# Patient Record
Sex: MALE | Race: WHITE | Employment: OTHER | URBAN - METROPOLITAN AREA
[De-identification: names, ages, dates, MRNs, and addresses within clinical notes are randomized per-mention and may not be internally consistent; named-entity substitution may affect disease eponyms.]

---

## 2018-04-14 ENCOUNTER — ANESTHESIA (OUTPATIENT)
Dept: ENDOSCOPY | Age: 78
DRG: 300 | End: 2018-04-14
Payer: MEDICARE

## 2018-04-14 ENCOUNTER — HOSPITAL ENCOUNTER (INPATIENT)
Age: 78
LOS: 5 days | Discharge: HOME | DRG: 300 | End: 2018-04-19
Attending: EMERGENCY MEDICINE | Admitting: INTERNAL MEDICINE
Payer: MEDICARE

## 2018-04-14 ENCOUNTER — APPOINTMENT (OUTPATIENT)
Dept: GENERAL RADIOLOGY | Age: 78
DRG: 300 | End: 2018-04-14
Payer: MEDICARE

## 2018-04-14 ENCOUNTER — ANESTHESIA EVENT (OUTPATIENT)
Dept: ENDOSCOPY | Age: 78
DRG: 300 | End: 2018-04-14
Payer: MEDICARE

## 2018-04-14 VITALS — SYSTOLIC BLOOD PRESSURE: 96 MMHG | OXYGEN SATURATION: 93 % | DIASTOLIC BLOOD PRESSURE: 51 MMHG

## 2018-04-14 DIAGNOSIS — R10.13 ABDOMINAL PAIN, EPIGASTRIC: ICD-10-CM

## 2018-04-14 DIAGNOSIS — R77.8 ELEVATED TROPONIN: ICD-10-CM

## 2018-04-14 DIAGNOSIS — R03.0 ELEVATED BLOOD PRESSURE READING: ICD-10-CM

## 2018-04-14 DIAGNOSIS — D64.9 ANEMIA, UNSPECIFIED TYPE: ICD-10-CM

## 2018-04-14 DIAGNOSIS — R07.9 CHEST PAIN, UNSPECIFIED TYPE: Primary | ICD-10-CM

## 2018-04-14 LAB
ABO/RH: NORMAL
ALBUMIN SERPL-MCNC: 4.1 G/DL (ref 3.5–5.2)
ALP BLD-CCNC: 113 U/L (ref 40–129)
ALT SERPL-CCNC: 9 U/L (ref 0–40)
ANION GAP SERPL CALCULATED.3IONS-SCNC: 22 MMOL/L (ref 7–16)
ANTIBODY SCREEN: NORMAL
APTT: 27.3 SEC (ref 24.5–35.1)
AST SERPL-CCNC: 14 U/L (ref 0–39)
BASOPHILS ABSOLUTE: 0.04 E9/L (ref 0–0.2)
BASOPHILS RELATIVE PERCENT: 0.4 % (ref 0–2)
BILIRUB SERPL-MCNC: <0.2 MG/DL (ref 0–1.2)
BUN BLDV-MCNC: 37 MG/DL (ref 8–23)
CALCIUM SERPL-MCNC: 9.3 MG/DL (ref 8.6–10.2)
CHLORIDE BLD-SCNC: 97 MMOL/L (ref 98–107)
CHP ED QC CHECK: NORMAL
CO2: 14 MMOL/L (ref 22–29)
CREAT SERPL-MCNC: 1.3 MG/DL (ref 0.7–1.2)
D DIMER: 387 NG/ML DDU
EKG ATRIAL RATE: 86 BPM
EKG P AXIS: 33 DEGREES
EKG P-R INTERVAL: 170 MS
EKG Q-T INTERVAL: 360 MS
EKG QRS DURATION: 94 MS
EKG QTC CALCULATION (BAZETT): 430 MS
EKG R AXIS: 7 DEGREES
EKG T AXIS: 26 DEGREES
EKG VENTRICULAR RATE: 86 BPM
EOSINOPHILS ABSOLUTE: 0.4 E9/L (ref 0.05–0.5)
EOSINOPHILS RELATIVE PERCENT: 3.8 % (ref 0–6)
GFR AFRICAN AMERICAN: >60
GFR NON-AFRICAN AMERICAN: 53 ML/MIN/1.73
GLUCOSE BLD-MCNC: 213 MG/DL (ref 74–109)
HCT VFR BLD CALC: 21.8 % (ref 37–54)
HCT VFR BLD CALC: 23.4 % (ref 37–54)
HEMOCCULT STL QL: NEGATIVE
HEMOGLOBIN: 7.1 G/DL (ref 12.5–16.5)
HEMOGLOBIN: 7.3 G/DL (ref 12.5–16.5)
IMMATURE GRANULOCYTES #: 0.07 E9/L
IMMATURE GRANULOCYTES %: 0.7 % (ref 0–5)
INR BLD: 1.1
LACTIC ACID: 2.7 MMOL/L (ref 0.5–2.2)
LIPASE: 77 U/L (ref 13–60)
LYMPHOCYTES ABSOLUTE: 1.38 E9/L (ref 1.5–4)
LYMPHOCYTES RELATIVE PERCENT: 13.3 % (ref 20–42)
MCH RBC QN AUTO: 25.7 PG (ref 26–35)
MCHC RBC AUTO-ENTMCNC: 31.2 % (ref 32–34.5)
MCV RBC AUTO: 82.4 FL (ref 80–99.9)
METER GLUCOSE: 112 MG/DL (ref 70–110)
METER GLUCOSE: 114 MG/DL (ref 70–110)
METER GLUCOSE: 115 MG/DL (ref 70–110)
METER GLUCOSE: 155 MG/DL (ref 70–110)
MONOCYTES ABSOLUTE: 0.86 E9/L (ref 0.1–0.95)
MONOCYTES RELATIVE PERCENT: 8.3 % (ref 2–12)
NEUTROPHILS ABSOLUTE: 7.64 E9/L (ref 1.8–7.3)
NEUTROPHILS RELATIVE PERCENT: 73.5 % (ref 43–80)
PDW BLD-RTO: 15.8 FL (ref 11.5–15)
PLATELET # BLD: 321 E9/L (ref 130–450)
PMV BLD AUTO: 9.5 FL (ref 7–12)
POTASSIUM SERPL-SCNC: 3.9 MMOL/L (ref 3.5–5)
PROTHROMBIN TIME: 12.4 SEC (ref 9.3–12.4)
RBC # BLD: 2.84 E12/L (ref 3.8–5.8)
SODIUM BLD-SCNC: 133 MMOL/L (ref 132–146)
TOTAL PROTEIN: 7.5 G/DL (ref 6.4–8.3)
TROPONIN: 0.05 NG/ML (ref 0–0.03)
WBC # BLD: 10.4 E9/L (ref 4.5–11.5)

## 2018-04-14 PROCEDURE — 83605 ASSAY OF LACTIC ACID: CPT

## 2018-04-14 PROCEDURE — 6370000000 HC RX 637 (ALT 250 FOR IP): Performed by: INTERNAL MEDICINE

## 2018-04-14 PROCEDURE — 88305 TISSUE EXAM BY PATHOLOGIST: CPT

## 2018-04-14 PROCEDURE — 3609012400 HC EGD TRANSORAL BIOPSY SINGLE/MULTIPLE: Performed by: SURGERY

## 2018-04-14 PROCEDURE — 43239 EGD BIOPSY SINGLE/MULTIPLE: CPT | Performed by: SURGERY

## 2018-04-14 PROCEDURE — 7100000001 HC PACU RECOVERY - ADDTL 15 MIN: Performed by: SURGERY

## 2018-04-14 PROCEDURE — 36415 COLL VENOUS BLD VENIPUNCTURE: CPT

## 2018-04-14 PROCEDURE — 83690 ASSAY OF LIPASE: CPT

## 2018-04-14 PROCEDURE — 0DB98ZX EXCISION OF DUODENUM, VIA NATURAL OR ARTIFICIAL OPENING ENDOSCOPIC, DIAGNOSTIC: ICD-10-PCS | Performed by: SURGERY

## 2018-04-14 PROCEDURE — 84484 ASSAY OF TROPONIN QUANT: CPT

## 2018-04-14 PROCEDURE — 80053 COMPREHEN METABOLIC PANEL: CPT

## 2018-04-14 PROCEDURE — 85730 THROMBOPLASTIN TIME PARTIAL: CPT

## 2018-04-14 PROCEDURE — 6360000002 HC RX W HCPCS: Performed by: STUDENT IN AN ORGANIZED HEALTH CARE EDUCATION/TRAINING PROGRAM

## 2018-04-14 PROCEDURE — 6370000000 HC RX 637 (ALT 250 FOR IP): Performed by: EMERGENCY MEDICINE

## 2018-04-14 PROCEDURE — 85018 HEMOGLOBIN: CPT

## 2018-04-14 PROCEDURE — 2580000003 HC RX 258: Performed by: NURSE ANESTHETIST, CERTIFIED REGISTERED

## 2018-04-14 PROCEDURE — 85378 FIBRIN DEGRADE SEMIQUANT: CPT

## 2018-04-14 PROCEDURE — C9113 INJ PANTOPRAZOLE SODIUM, VIA: HCPCS | Performed by: STUDENT IN AN ORGANIZED HEALTH CARE EDUCATION/TRAINING PROGRAM

## 2018-04-14 PROCEDURE — 2580000003 HC RX 258: Performed by: INTERNAL MEDICINE

## 2018-04-14 PROCEDURE — 99285 EMERGENCY DEPT VISIT HI MDM: CPT

## 2018-04-14 PROCEDURE — 3700000000 HC ANESTHESIA ATTENDED CARE: Performed by: SURGERY

## 2018-04-14 PROCEDURE — 99222 1ST HOSP IP/OBS MODERATE 55: CPT | Performed by: SURGERY

## 2018-04-14 PROCEDURE — 2140000000 HC CCU INTERMEDIATE R&B

## 2018-04-14 PROCEDURE — 2580000003 HC RX 258: Performed by: STUDENT IN AN ORGANIZED HEALTH CARE EDUCATION/TRAINING PROGRAM

## 2018-04-14 PROCEDURE — 82962 GLUCOSE BLOOD TEST: CPT

## 2018-04-14 PROCEDURE — 85610 PROTHROMBIN TIME: CPT

## 2018-04-14 PROCEDURE — 94760 N-INVAS EAR/PLS OXIMETRY 1: CPT

## 2018-04-14 PROCEDURE — 6360000002 HC RX W HCPCS: Performed by: NURSE ANESTHETIST, CERTIFIED REGISTERED

## 2018-04-14 PROCEDURE — 86901 BLOOD TYPING SEROLOGIC RH(D): CPT

## 2018-04-14 PROCEDURE — 85025 COMPLETE CBC W/AUTO DIFF WBC: CPT

## 2018-04-14 PROCEDURE — 93005 ELECTROCARDIOGRAM TRACING: CPT | Performed by: EMERGENCY MEDICINE

## 2018-04-14 PROCEDURE — 86900 BLOOD TYPING SEROLOGIC ABO: CPT

## 2018-04-14 PROCEDURE — 6370000000 HC RX 637 (ALT 250 FOR IP): Performed by: STUDENT IN AN ORGANIZED HEALTH CARE EDUCATION/TRAINING PROGRAM

## 2018-04-14 PROCEDURE — 86850 RBC ANTIBODY SCREEN: CPT

## 2018-04-14 PROCEDURE — 7100000000 HC PACU RECOVERY - FIRST 15 MIN: Performed by: SURGERY

## 2018-04-14 PROCEDURE — 85014 HEMATOCRIT: CPT

## 2018-04-14 PROCEDURE — 3700000001 HC ADD 15 MINUTES (ANESTHESIA): Performed by: SURGERY

## 2018-04-14 PROCEDURE — 71045 X-RAY EXAM CHEST 1 VIEW: CPT

## 2018-04-14 RX ORDER — POLYETHYLENE GLYCOL 3350, SODIUM CHLORIDE, POTASSIUM CHLORIDE, SODIUM BICARBONATE, AND SODIUM SULFATE 240; 5.84; 2.98; 6.72; 22.72 G/4L; G/4L; G/4L; G/4L; G/4L
4000 POWDER, FOR SOLUTION ORAL ONCE
Status: COMPLETED | OUTPATIENT
Start: 2018-04-14 | End: 2018-04-14

## 2018-04-14 RX ORDER — SODIUM CHLORIDE 0.9 % (FLUSH) 0.9 %
10 SYRINGE (ML) INJECTION EVERY 12 HOURS SCHEDULED
Status: DISCONTINUED | OUTPATIENT
Start: 2018-04-14 | End: 2018-04-19 | Stop reason: HOSPADM

## 2018-04-14 RX ORDER — 0.9 % SODIUM CHLORIDE 0.9 %
10 VIAL (ML) INJECTION DAILY
Status: DISCONTINUED | OUTPATIENT
Start: 2018-04-14 | End: 2018-04-14

## 2018-04-14 RX ORDER — SODIUM CHLORIDE 9 MG/ML
INJECTION, SOLUTION INTRAVENOUS CONTINUOUS PRN
Status: DISCONTINUED | OUTPATIENT
Start: 2018-04-14 | End: 2018-04-14 | Stop reason: SDUPTHER

## 2018-04-14 RX ORDER — ACETAMINOPHEN 325 MG/1
650 TABLET ORAL EVERY 4 HOURS PRN
Status: DISCONTINUED | OUTPATIENT
Start: 2018-04-14 | End: 2018-04-19 | Stop reason: HOSPADM

## 2018-04-14 RX ORDER — SODIUM CHLORIDE 0.9 % (FLUSH) 0.9 %
10 SYRINGE (ML) INJECTION PRN
Status: DISCONTINUED | OUTPATIENT
Start: 2018-04-14 | End: 2018-04-19 | Stop reason: HOSPADM

## 2018-04-14 RX ORDER — ASPIRIN 81 MG/1
324 TABLET, CHEWABLE ORAL ONCE
Status: DISCONTINUED | OUTPATIENT
Start: 2018-04-14 | End: 2018-04-14

## 2018-04-14 RX ORDER — AMLODIPINE BESYLATE 10 MG/1
10 TABLET ORAL DAILY
Status: DISCONTINUED | OUTPATIENT
Start: 2018-04-14 | End: 2018-04-19 | Stop reason: HOSPADM

## 2018-04-14 RX ORDER — KETOROLAC TROMETHAMINE 30 MG/ML
15 INJECTION, SOLUTION INTRAMUSCULAR; INTRAVENOUS ONCE
Status: DISCONTINUED | OUTPATIENT
Start: 2018-04-14 | End: 2018-04-14

## 2018-04-14 RX ORDER — NICOTINE 21 MG/24HR
1 PATCH, TRANSDERMAL 24 HOURS TRANSDERMAL DAILY
Status: DISCONTINUED | OUTPATIENT
Start: 2018-04-14 | End: 2018-04-19 | Stop reason: HOSPADM

## 2018-04-14 RX ORDER — NITROGLYCERIN 0.4 MG/1
0.4 TABLET SUBLINGUAL EVERY 5 MIN PRN
Status: DISCONTINUED | OUTPATIENT
Start: 2018-04-14 | End: 2018-04-19 | Stop reason: HOSPADM

## 2018-04-14 RX ORDER — ATENOLOL 50 MG/1
50 TABLET ORAL DAILY
Status: DISCONTINUED | OUTPATIENT
Start: 2018-04-14 | End: 2018-04-16

## 2018-04-14 RX ORDER — PANTOPRAZOLE SODIUM 40 MG/10ML
40 INJECTION, POWDER, LYOPHILIZED, FOR SOLUTION INTRAVENOUS 2 TIMES DAILY
Status: DISCONTINUED | OUTPATIENT
Start: 2018-04-14 | End: 2018-04-18

## 2018-04-14 RX ORDER — DEXTROSE MONOHYDRATE 25 G/50ML
12.5 INJECTION, SOLUTION INTRAVENOUS PRN
Status: DISCONTINUED | OUTPATIENT
Start: 2018-04-14 | End: 2018-04-19 | Stop reason: HOSPADM

## 2018-04-14 RX ORDER — SODIUM CHLORIDE 9 MG/ML
INJECTION, SOLUTION INTRAVENOUS CONTINUOUS
Status: DISCONTINUED | OUTPATIENT
Start: 2018-04-14 | End: 2018-04-14 | Stop reason: ALTCHOICE

## 2018-04-14 RX ORDER — LISINOPRIL 20 MG/1
40 TABLET ORAL DAILY
Status: DISCONTINUED | OUTPATIENT
Start: 2018-04-14 | End: 2018-04-19 | Stop reason: HOSPADM

## 2018-04-14 RX ORDER — DEXTROSE MONOHYDRATE 50 MG/ML
100 INJECTION, SOLUTION INTRAVENOUS PRN
Status: DISCONTINUED | OUTPATIENT
Start: 2018-04-14 | End: 2018-04-19 | Stop reason: HOSPADM

## 2018-04-14 RX ORDER — SODIUM CHLORIDE 9 MG/ML
INJECTION, SOLUTION INTRAVENOUS CONTINUOUS
Status: DISCONTINUED | OUTPATIENT
Start: 2018-04-14 | End: 2018-04-19

## 2018-04-14 RX ORDER — POLYETHYLENE GLYCOL 3350, SODIUM CHLORIDE, POTASSIUM CHLORIDE, SODIUM BICARBONATE, AND SODIUM SULFATE 240; 5.84; 2.98; 6.72; 22.72 G/4L; G/4L; G/4L; G/4L; G/4L
4000 POWDER, FOR SOLUTION ORAL ONCE
Status: DISCONTINUED | OUTPATIENT
Start: 2018-04-14 | End: 2018-04-14

## 2018-04-14 RX ORDER — PROPOFOL 10 MG/ML
INJECTION, EMULSION INTRAVENOUS PRN
Status: DISCONTINUED | OUTPATIENT
Start: 2018-04-14 | End: 2018-04-14 | Stop reason: SDUPTHER

## 2018-04-14 RX ORDER — PANTOPRAZOLE SODIUM 40 MG/10ML
40 INJECTION, POWDER, LYOPHILIZED, FOR SOLUTION INTRAVENOUS DAILY
Status: DISCONTINUED | OUTPATIENT
Start: 2018-04-14 | End: 2018-04-14

## 2018-04-14 RX ORDER — NICOTINE POLACRILEX 4 MG
15 LOZENGE BUCCAL PRN
Status: DISCONTINUED | OUTPATIENT
Start: 2018-04-14 | End: 2018-04-19 | Stop reason: HOSPADM

## 2018-04-14 RX ORDER — PROPOFOL 10 MG/ML
INJECTION, EMULSION INTRAVENOUS PRN
Status: DISCONTINUED | OUTPATIENT
Start: 2018-04-14 | End: 2018-04-14

## 2018-04-14 RX ORDER — 0.9 % SODIUM CHLORIDE 0.9 %
10 VIAL (ML) INJECTION 2 TIMES DAILY
Status: DISCONTINUED | OUTPATIENT
Start: 2018-04-14 | End: 2018-04-18

## 2018-04-14 RX ADMIN — LISINOPRIL 40 MG: 20 TABLET ORAL at 09:34

## 2018-04-14 RX ADMIN — Medication 10 ML: at 20:58

## 2018-04-14 RX ADMIN — PANTOPRAZOLE SODIUM 40 MG: 40 INJECTION, POWDER, FOR SOLUTION INTRAVENOUS at 20:58

## 2018-04-14 RX ADMIN — ATENOLOL 50 MG: 50 TABLET ORAL at 09:34

## 2018-04-14 RX ADMIN — NITROGLYCERIN 0.4 MG: 0.4 TABLET SUBLINGUAL at 03:14

## 2018-04-14 RX ADMIN — Medication 10 ML: at 09:32

## 2018-04-14 RX ADMIN — Medication 10 ML: at 09:38

## 2018-04-14 RX ADMIN — SODIUM CHLORIDE: 9 INJECTION, SOLUTION INTRAVENOUS at 09:30

## 2018-04-14 RX ADMIN — SODIUM CHLORIDE: 9 INJECTION, SOLUTION INTRAVENOUS at 16:36

## 2018-04-14 RX ADMIN — POLYETHYLENE GLYCOL 3350, SODIUM CHLORIDE, POTASSIUM CHLORIDE, SODIUM BICARBONATE, AND SODIUM SULFATE 4000 ML: 240; 5.84; 2.98; 6.72; 22.72 POWDER, FOR SOLUTION ORAL at 16:27

## 2018-04-14 RX ADMIN — PANTOPRAZOLE SODIUM 40 MG: 40 INJECTION, POWDER, FOR SOLUTION INTRAVENOUS at 09:34

## 2018-04-14 RX ADMIN — PROPOFOL 150 MG: 10 INJECTION, EMULSION INTRAVENOUS at 12:00

## 2018-04-14 RX ADMIN — SODIUM CHLORIDE: 9 INJECTION, SOLUTION INTRAVENOUS at 11:55

## 2018-04-14 RX ADMIN — AMLODIPINE BESYLATE 10 MG: 10 TABLET ORAL at 09:34

## 2018-04-14 ASSESSMENT — PAIN DESCRIPTION - LOCATION
LOCATION: ABDOMEN
LOCATION: CHEST

## 2018-04-14 ASSESSMENT — PAIN DESCRIPTION - DESCRIPTORS
DESCRIPTORS: ACHING
DESCRIPTORS: ACHING;DISCOMFORT

## 2018-04-14 ASSESSMENT — PAIN SCALES - GENERAL
PAINLEVEL_OUTOF10: 0
PAINLEVEL_OUTOF10: 6
PAINLEVEL_OUTOF10: 0
PAINLEVEL_OUTOF10: 4

## 2018-04-14 ASSESSMENT — PAIN DESCRIPTION - PAIN TYPE
TYPE: ACUTE PAIN
TYPE: ACUTE PAIN

## 2018-04-14 ASSESSMENT — PAIN DESCRIPTION - FREQUENCY
FREQUENCY: CONTINUOUS
FREQUENCY: INTERMITTENT

## 2018-04-14 ASSESSMENT — PAIN DESCRIPTION - ONSET: ONSET: ON-GOING

## 2018-04-14 ASSESSMENT — PAIN DESCRIPTION - ORIENTATION: ORIENTATION: MID

## 2018-04-15 LAB
BLOOD BANK DISPENSE STATUS: NORMAL
BLOOD BANK PRODUCT CODE: NORMAL
BPU ID: NORMAL
DESCRIPTION BLOOD BANK: NORMAL
HCT VFR BLD CALC: 20.4 % (ref 37–54)
HCT VFR BLD CALC: 22.1 % (ref 37–54)
HEMOGLOBIN: 6.4 G/DL (ref 12.5–16.5)
HEMOGLOBIN: 6.9 G/DL (ref 12.5–16.5)
METER GLUCOSE: 104 MG/DL (ref 70–110)
METER GLUCOSE: 143 MG/DL (ref 70–110)
METER GLUCOSE: 226 MG/DL (ref 70–110)
METER GLUCOSE: 94 MG/DL (ref 70–110)

## 2018-04-15 PROCEDURE — 85018 HEMOGLOBIN: CPT

## 2018-04-15 PROCEDURE — 36430 TRANSFUSION BLD/BLD COMPNT: CPT

## 2018-04-15 PROCEDURE — 6360000002 HC RX W HCPCS: Performed by: STUDENT IN AN ORGANIZED HEALTH CARE EDUCATION/TRAINING PROGRAM

## 2018-04-15 PROCEDURE — 36415 COLL VENOUS BLD VENIPUNCTURE: CPT

## 2018-04-15 PROCEDURE — 6370000000 HC RX 637 (ALT 250 FOR IP): Performed by: STUDENT IN AN ORGANIZED HEALTH CARE EDUCATION/TRAINING PROGRAM

## 2018-04-15 PROCEDURE — 2580000003 HC RX 258: Performed by: INTERNAL MEDICINE

## 2018-04-15 PROCEDURE — 82962 GLUCOSE BLOOD TEST: CPT

## 2018-04-15 PROCEDURE — 85014 HEMATOCRIT: CPT

## 2018-04-15 PROCEDURE — 86923 COMPATIBILITY TEST ELECTRIC: CPT

## 2018-04-15 PROCEDURE — 2580000003 HC RX 258: Performed by: STUDENT IN AN ORGANIZED HEALTH CARE EDUCATION/TRAINING PROGRAM

## 2018-04-15 PROCEDURE — 6370000000 HC RX 637 (ALT 250 FOR IP): Performed by: INTERNAL MEDICINE

## 2018-04-15 PROCEDURE — 2140000000 HC CCU INTERMEDIATE R&B

## 2018-04-15 PROCEDURE — P9016 RBC LEUKOCYTES REDUCED: HCPCS

## 2018-04-15 PROCEDURE — C9113 INJ PANTOPRAZOLE SODIUM, VIA: HCPCS | Performed by: STUDENT IN AN ORGANIZED HEALTH CARE EDUCATION/TRAINING PROGRAM

## 2018-04-15 RX ORDER — 0.9 % SODIUM CHLORIDE 0.9 %
250 INTRAVENOUS SOLUTION INTRAVENOUS ONCE
Status: COMPLETED | OUTPATIENT
Start: 2018-04-15 | End: 2018-04-16

## 2018-04-15 RX ORDER — TRAMADOL HYDROCHLORIDE 50 MG/1
50 TABLET ORAL EVERY 6 HOURS PRN
Status: ON HOLD | COMMUNITY
End: 2020-03-19 | Stop reason: HOSPADM

## 2018-04-15 RX ORDER — CLOPIDOGREL BISULFATE 75 MG/1
75 TABLET ORAL DAILY
Status: ON HOLD | COMMUNITY
End: 2020-12-10 | Stop reason: HOSPADM

## 2018-04-15 RX ADMIN — Medication 10 ML: at 21:02

## 2018-04-15 RX ADMIN — SODIUM CHLORIDE: 9 INJECTION, SOLUTION INTRAVENOUS at 17:54

## 2018-04-15 RX ADMIN — SODIUM CHLORIDE 250 ML: 9 INJECTION, SOLUTION INTRAVENOUS at 21:33

## 2018-04-15 RX ADMIN — BISACODYL 10 MG: 5 TABLET, COATED ORAL at 15:01

## 2018-04-15 RX ADMIN — Medication 10 ML: at 06:13

## 2018-04-15 RX ADMIN — PANTOPRAZOLE SODIUM 40 MG: 40 INJECTION, POWDER, FOR SOLUTION INTRAVENOUS at 06:13

## 2018-04-15 RX ADMIN — ATENOLOL 50 MG: 50 TABLET ORAL at 08:50

## 2018-04-15 RX ADMIN — LISINOPRIL 40 MG: 20 TABLET ORAL at 08:49

## 2018-04-15 RX ADMIN — PANTOPRAZOLE SODIUM 40 MG: 40 INJECTION, POWDER, FOR SOLUTION INTRAVENOUS at 21:02

## 2018-04-15 RX ADMIN — INSULIN LISPRO 2 UNITS: 100 INJECTION, SOLUTION INTRAVENOUS; SUBCUTANEOUS at 16:29

## 2018-04-15 RX ADMIN — SODIUM CHLORIDE: 9 INJECTION, SOLUTION INTRAVENOUS at 06:12

## 2018-04-15 RX ADMIN — BISACODYL 10 MG: 5 TABLET, COATED ORAL at 17:52

## 2018-04-15 RX ADMIN — AMLODIPINE BESYLATE 10 MG: 10 TABLET ORAL at 08:50

## 2018-04-15 RX ADMIN — Medication 10 ML: at 21:03

## 2018-04-15 ASSESSMENT — PAIN SCALES - GENERAL
PAINLEVEL_OUTOF10: 0

## 2018-04-16 ENCOUNTER — ANESTHESIA EVENT (OUTPATIENT)
Dept: ENDOSCOPY | Age: 78
DRG: 300 | End: 2018-04-16
Payer: MEDICARE

## 2018-04-16 ENCOUNTER — ANESTHESIA (OUTPATIENT)
Dept: ENDOSCOPY | Age: 78
DRG: 300 | End: 2018-04-16
Payer: MEDICARE

## 2018-04-16 VITALS
DIASTOLIC BLOOD PRESSURE: 56 MMHG | OXYGEN SATURATION: 100 % | RESPIRATION RATE: 12 BRPM | SYSTOLIC BLOOD PRESSURE: 129 MMHG

## 2018-04-16 LAB
BLOOD BANK DISPENSE STATUS: NORMAL
BLOOD BANK PRODUCT CODE: NORMAL
BPU ID: NORMAL
DESCRIPTION BLOOD BANK: NORMAL
HCT VFR BLD CALC: 21.1 % (ref 37–54)
HCT VFR BLD CALC: 25.6 % (ref 37–54)
HCT VFR BLD CALC: 27.8 % (ref 37–54)
HCT VFR BLD CALC: 28 % (ref 37–54)
HEMOGLOBIN: 6.6 G/DL (ref 12.5–16.5)
HEMOGLOBIN: 8 G/DL (ref 12.5–16.5)
HEMOGLOBIN: 8.7 G/DL (ref 12.5–16.5)
HEMOGLOBIN: 9 G/DL (ref 12.5–16.5)
METER GLUCOSE: 104 MG/DL (ref 70–110)
METER GLUCOSE: 106 MG/DL (ref 70–110)
METER GLUCOSE: 207 MG/DL (ref 70–110)
METER GLUCOSE: 94 MG/DL (ref 70–110)

## 2018-04-16 PROCEDURE — 3609009900 HC COLONOSCOPY W/CONTROL BLEEDING ANY METHOD: Performed by: SURGERY

## 2018-04-16 PROCEDURE — 36430 TRANSFUSION BLD/BLD COMPNT: CPT

## 2018-04-16 PROCEDURE — C9113 INJ PANTOPRAZOLE SODIUM, VIA: HCPCS | Performed by: STUDENT IN AN ORGANIZED HEALTH CARE EDUCATION/TRAINING PROGRAM

## 2018-04-16 PROCEDURE — 45382 COLONOSCOPY W/CONTROL BLEED: CPT | Performed by: SURGERY

## 2018-04-16 PROCEDURE — 36415 COLL VENOUS BLD VENIPUNCTURE: CPT

## 2018-04-16 PROCEDURE — 6370000000 HC RX 637 (ALT 250 FOR IP): Performed by: INTERNAL MEDICINE

## 2018-04-16 PROCEDURE — 2580000003 HC RX 258: Performed by: INTERNAL MEDICINE

## 2018-04-16 PROCEDURE — 85018 HEMOGLOBIN: CPT

## 2018-04-16 PROCEDURE — 2580000003 HC RX 258: Performed by: NURSE ANESTHETIST, CERTIFIED REGISTERED

## 2018-04-16 PROCEDURE — 0DBL8ZX EXCISION OF TRANSVERSE COLON, VIA NATURAL OR ARTIFICIAL OPENING ENDOSCOPIC, DIAGNOSTIC: ICD-10-PCS | Performed by: SURGERY

## 2018-04-16 PROCEDURE — 2140000000 HC CCU INTERMEDIATE R&B

## 2018-04-16 PROCEDURE — 2720000010 HC SURG SUPPLY STERILE: Performed by: SURGERY

## 2018-04-16 PROCEDURE — 6360000002 HC RX W HCPCS: Performed by: STUDENT IN AN ORGANIZED HEALTH CARE EDUCATION/TRAINING PROGRAM

## 2018-04-16 PROCEDURE — 45384 COLONOSCOPY W/LESION REMOVAL: CPT | Performed by: SURGERY

## 2018-04-16 PROCEDURE — 3700000001 HC ADD 15 MINUTES (ANESTHESIA): Performed by: SURGERY

## 2018-04-16 PROCEDURE — 0W3P8ZZ CONTROL BLEEDING IN GASTROINTESTINAL TRACT, VIA NATURAL OR ARTIFICIAL OPENING ENDOSCOPIC: ICD-10-PCS | Performed by: SURGERY

## 2018-04-16 PROCEDURE — 2580000003 HC RX 258: Performed by: STUDENT IN AN ORGANIZED HEALTH CARE EDUCATION/TRAINING PROGRAM

## 2018-04-16 PROCEDURE — 88305 TISSUE EXAM BY PATHOLOGIST: CPT

## 2018-04-16 PROCEDURE — 3700000000 HC ANESTHESIA ATTENDED CARE: Performed by: SURGERY

## 2018-04-16 PROCEDURE — 86923 COMPATIBILITY TEST ELECTRIC: CPT

## 2018-04-16 PROCEDURE — P9016 RBC LEUKOCYTES REDUCED: HCPCS

## 2018-04-16 PROCEDURE — 7100000001 HC PACU RECOVERY - ADDTL 15 MIN: Performed by: SURGERY

## 2018-04-16 PROCEDURE — 7100000000 HC PACU RECOVERY - FIRST 15 MIN: Performed by: SURGERY

## 2018-04-16 PROCEDURE — 3609010300 HC COLONOSCOPY W/BIOPSY SINGLE/MULTIPLE: Performed by: SURGERY

## 2018-04-16 PROCEDURE — 6360000002 HC RX W HCPCS: Performed by: NURSE ANESTHETIST, CERTIFIED REGISTERED

## 2018-04-16 PROCEDURE — 82962 GLUCOSE BLOOD TEST: CPT

## 2018-04-16 PROCEDURE — 85014 HEMATOCRIT: CPT

## 2018-04-16 RX ORDER — 0.9 % SODIUM CHLORIDE 0.9 %
250 INTRAVENOUS SOLUTION INTRAVENOUS ONCE
Status: COMPLETED | OUTPATIENT
Start: 2018-04-16 | End: 2018-04-16

## 2018-04-16 RX ORDER — FENTANYL CITRATE 50 UG/ML
INJECTION, SOLUTION INTRAMUSCULAR; INTRAVENOUS PRN
Status: DISCONTINUED | OUTPATIENT
Start: 2018-04-16 | End: 2018-04-16 | Stop reason: SDUPTHER

## 2018-04-16 RX ORDER — PROPOFOL 10 MG/ML
INJECTION, EMULSION INTRAVENOUS PRN
Status: DISCONTINUED | OUTPATIENT
Start: 2018-04-16 | End: 2018-04-16 | Stop reason: SDUPTHER

## 2018-04-16 RX ORDER — ATENOLOL 25 MG/1
25 TABLET ORAL DAILY
Status: DISCONTINUED | OUTPATIENT
Start: 2018-04-17 | End: 2018-04-19 | Stop reason: HOSPADM

## 2018-04-16 RX ORDER — SODIUM CHLORIDE 9 MG/ML
INJECTION, SOLUTION INTRAVENOUS CONTINUOUS PRN
Status: DISCONTINUED | OUTPATIENT
Start: 2018-04-16 | End: 2018-04-16 | Stop reason: SDUPTHER

## 2018-04-16 RX ADMIN — SODIUM CHLORIDE: 9 INJECTION, SOLUTION INTRAVENOUS at 12:52

## 2018-04-16 RX ADMIN — PANTOPRAZOLE SODIUM 40 MG: 40 INJECTION, POWDER, FOR SOLUTION INTRAVENOUS at 15:31

## 2018-04-16 RX ADMIN — INSULIN LISPRO 1 UNITS: 100 INJECTION, SOLUTION INTRAVENOUS; SUBCUTANEOUS at 21:03

## 2018-04-16 RX ADMIN — Medication 10 ML: at 15:31

## 2018-04-16 RX ADMIN — SODIUM CHLORIDE: 9 INJECTION, SOLUTION INTRAVENOUS at 15:58

## 2018-04-16 RX ADMIN — AMLODIPINE BESYLATE 10 MG: 10 TABLET ORAL at 10:14

## 2018-04-16 RX ADMIN — ATENOLOL 25 MG: 25 TABLET ORAL at 10:17

## 2018-04-16 RX ADMIN — Medication 10 ML: at 10:18

## 2018-04-16 RX ADMIN — FENTANYL CITRATE 50 MCG: 50 INJECTION, SOLUTION INTRAMUSCULAR; INTRAVENOUS at 13:05

## 2018-04-16 RX ADMIN — Medication 10 ML: at 20:52

## 2018-04-16 RX ADMIN — PANTOPRAZOLE SODIUM 40 MG: 40 INJECTION, POWDER, FOR SOLUTION INTRAVENOUS at 20:52

## 2018-04-16 RX ADMIN — Medication 10 ML: at 15:33

## 2018-04-16 RX ADMIN — SODIUM CHLORIDE 250 ML: 9 INJECTION, SOLUTION INTRAVENOUS at 10:00

## 2018-04-16 RX ADMIN — LISINOPRIL 40 MG: 20 TABLET ORAL at 10:14

## 2018-04-16 RX ADMIN — FENTANYL CITRATE 50 MCG: 50 INJECTION, SOLUTION INTRAMUSCULAR; INTRAVENOUS at 13:16

## 2018-04-16 RX ADMIN — PROPOFOL 170 MG: 10 INJECTION, EMULSION INTRAVENOUS at 13:50

## 2018-04-16 ASSESSMENT — PAIN SCALES - GENERAL
PAINLEVEL_OUTOF10: 0
PAINLEVEL_OUTOF10: 4
PAINLEVEL_OUTOF10: 0
PAINLEVEL_OUTOF10: 0

## 2018-04-16 ASSESSMENT — PAIN DESCRIPTION - DESCRIPTORS: DESCRIPTORS: BURNING

## 2018-04-16 ASSESSMENT — PAIN DESCRIPTION - LOCATION: LOCATION: ABDOMEN

## 2018-04-16 ASSESSMENT — LIFESTYLE VARIABLES: SMOKING_STATUS: 1

## 2018-04-16 ASSESSMENT — PAIN DESCRIPTION - PAIN TYPE: TYPE: ACUTE PAIN

## 2018-04-17 ENCOUNTER — APPOINTMENT (OUTPATIENT)
Dept: NUCLEAR MEDICINE | Age: 78
DRG: 300 | End: 2018-04-17
Payer: MEDICARE

## 2018-04-17 LAB
HCT VFR BLD CALC: 23.9 % (ref 37–54)
HCT VFR BLD CALC: 23.9 % (ref 37–54)
HCT VFR BLD CALC: 25.8 % (ref 37–54)
HCT VFR BLD CALC: 26.1 % (ref 37–54)
HEMOGLOBIN: 7.6 G/DL (ref 12.5–16.5)
HEMOGLOBIN: 7.8 G/DL (ref 12.5–16.5)
HEMOGLOBIN: 8.1 G/DL (ref 12.5–16.5)
HEMOGLOBIN: 8.3 G/DL (ref 12.5–16.5)
METER GLUCOSE: 119 MG/DL (ref 70–110)
METER GLUCOSE: 124 MG/DL (ref 70–110)
METER GLUCOSE: 132 MG/DL (ref 70–110)
METER GLUCOSE: 210 MG/DL (ref 70–110)

## 2018-04-17 PROCEDURE — 85018 HEMOGLOBIN: CPT

## 2018-04-17 PROCEDURE — 36415 COLL VENOUS BLD VENIPUNCTURE: CPT

## 2018-04-17 PROCEDURE — 2580000003 HC RX 258: Performed by: STUDENT IN AN ORGANIZED HEALTH CARE EDUCATION/TRAINING PROGRAM

## 2018-04-17 PROCEDURE — 6370000000 HC RX 637 (ALT 250 FOR IP): Performed by: INTERNAL MEDICINE

## 2018-04-17 PROCEDURE — 99233 SBSQ HOSP IP/OBS HIGH 50: CPT | Performed by: SURGERY

## 2018-04-17 PROCEDURE — 6360000002 HC RX W HCPCS: Performed by: STUDENT IN AN ORGANIZED HEALTH CARE EDUCATION/TRAINING PROGRAM

## 2018-04-17 PROCEDURE — 78278 ACUTE GI BLOOD LOSS IMAGING: CPT

## 2018-04-17 PROCEDURE — C9113 INJ PANTOPRAZOLE SODIUM, VIA: HCPCS | Performed by: STUDENT IN AN ORGANIZED HEALTH CARE EDUCATION/TRAINING PROGRAM

## 2018-04-17 PROCEDURE — A9560 TC99M LABELED RBC: HCPCS | Performed by: RADIOLOGY

## 2018-04-17 PROCEDURE — 2580000003 HC RX 258: Performed by: INTERNAL MEDICINE

## 2018-04-17 PROCEDURE — 1200000000 HC SEMI PRIVATE

## 2018-04-17 PROCEDURE — 3430000000 HC RX DIAGNOSTIC RADIOPHARMACEUTICAL: Performed by: RADIOLOGY

## 2018-04-17 PROCEDURE — 85014 HEMATOCRIT: CPT

## 2018-04-17 PROCEDURE — 82962 GLUCOSE BLOOD TEST: CPT

## 2018-04-17 RX ADMIN — PANTOPRAZOLE SODIUM 40 MG: 40 INJECTION, POWDER, FOR SOLUTION INTRAVENOUS at 21:18

## 2018-04-17 RX ADMIN — PANTOPRAZOLE SODIUM 40 MG: 40 INJECTION, POWDER, FOR SOLUTION INTRAVENOUS at 08:26

## 2018-04-17 RX ADMIN — AMLODIPINE BESYLATE 10 MG: 10 TABLET ORAL at 08:26

## 2018-04-17 RX ADMIN — LISINOPRIL 40 MG: 20 TABLET ORAL at 08:26

## 2018-04-17 RX ADMIN — Medication 10 ML: at 21:18

## 2018-04-17 RX ADMIN — Medication 25 MILLICURIE: at 11:03

## 2018-04-17 RX ADMIN — SODIUM CHLORIDE: 9 INJECTION, SOLUTION INTRAVENOUS at 21:15

## 2018-04-17 RX ADMIN — SODIUM CHLORIDE: 9 INJECTION, SOLUTION INTRAVENOUS at 08:53

## 2018-04-17 RX ADMIN — SODIUM CHLORIDE: 9 INJECTION, SOLUTION INTRAVENOUS at 05:57

## 2018-04-17 RX ADMIN — ATENOLOL 25 MG: 25 TABLET ORAL at 08:26

## 2018-04-17 RX ADMIN — INSULIN LISPRO 1 UNITS: 100 INJECTION, SOLUTION INTRAVENOUS; SUBCUTANEOUS at 21:19

## 2018-04-17 RX ADMIN — Medication 10 ML: at 21:19

## 2018-04-17 ASSESSMENT — PAIN SCALES - GENERAL
PAINLEVEL_OUTOF10: 0
PAINLEVEL_OUTOF10: 0

## 2018-04-18 LAB
HCT VFR BLD CALC: 24.2 % (ref 37–54)
HCT VFR BLD CALC: 24.2 % (ref 37–54)
HCT VFR BLD CALC: 25 % (ref 37–54)
HEMOGLOBIN: 7.8 G/DL (ref 12.5–16.5)
HEMOGLOBIN: 7.8 G/DL (ref 12.5–16.5)
HEMOGLOBIN: 8 G/DL (ref 12.5–16.5)
METER GLUCOSE: 127 MG/DL (ref 70–110)
METER GLUCOSE: 143 MG/DL (ref 70–110)
METER GLUCOSE: 175 MG/DL (ref 70–110)
METER GLUCOSE: 205 MG/DL (ref 70–110)

## 2018-04-18 PROCEDURE — 6370000000 HC RX 637 (ALT 250 FOR IP): Performed by: INTERNAL MEDICINE

## 2018-04-18 PROCEDURE — 1200000000 HC SEMI PRIVATE

## 2018-04-18 PROCEDURE — 85018 HEMOGLOBIN: CPT

## 2018-04-18 PROCEDURE — 36415 COLL VENOUS BLD VENIPUNCTURE: CPT

## 2018-04-18 PROCEDURE — 2580000003 HC RX 258: Performed by: INTERNAL MEDICINE

## 2018-04-18 PROCEDURE — 85014 HEMATOCRIT: CPT

## 2018-04-18 PROCEDURE — 6360000002 HC RX W HCPCS: Performed by: STUDENT IN AN ORGANIZED HEALTH CARE EDUCATION/TRAINING PROGRAM

## 2018-04-18 PROCEDURE — C9113 INJ PANTOPRAZOLE SODIUM, VIA: HCPCS | Performed by: STUDENT IN AN ORGANIZED HEALTH CARE EDUCATION/TRAINING PROGRAM

## 2018-04-18 PROCEDURE — 82962 GLUCOSE BLOOD TEST: CPT

## 2018-04-18 RX ORDER — 0.9 % SODIUM CHLORIDE 0.9 %
10 VIAL (ML) INJECTION
Status: DISCONTINUED | OUTPATIENT
Start: 2018-04-18 | End: 2018-04-19 | Stop reason: HOSPADM

## 2018-04-18 RX ORDER — PANTOPRAZOLE SODIUM 40 MG/10ML
40 INJECTION, POWDER, LYOPHILIZED, FOR SOLUTION INTRAVENOUS
Status: DISCONTINUED | OUTPATIENT
Start: 2018-04-18 | End: 2018-04-19 | Stop reason: HOSPADM

## 2018-04-18 RX ADMIN — SODIUM CHLORIDE: 9 INJECTION, SOLUTION INTRAVENOUS at 23:12

## 2018-04-18 RX ADMIN — LISINOPRIL 40 MG: 20 TABLET ORAL at 08:53

## 2018-04-18 RX ADMIN — INSULIN LISPRO 1 UNITS: 100 INJECTION, SOLUTION INTRAVENOUS; SUBCUTANEOUS at 21:42

## 2018-04-18 RX ADMIN — Medication 10 ML: at 08:59

## 2018-04-18 RX ADMIN — ATENOLOL 25 MG: 25 TABLET ORAL at 08:53

## 2018-04-18 RX ADMIN — INSULIN LISPRO 1 UNITS: 100 INJECTION, SOLUTION INTRAVENOUS; SUBCUTANEOUS at 12:47

## 2018-04-18 RX ADMIN — SODIUM CHLORIDE: 9 INJECTION, SOLUTION INTRAVENOUS at 12:32

## 2018-04-18 RX ADMIN — INSULIN LISPRO 1 UNITS: 100 INJECTION, SOLUTION INTRAVENOUS; SUBCUTANEOUS at 17:34

## 2018-04-18 RX ADMIN — AMLODIPINE BESYLATE 10 MG: 10 TABLET ORAL at 08:53

## 2018-04-18 RX ADMIN — PANTOPRAZOLE SODIUM 40 MG: 40 INJECTION, POWDER, FOR SOLUTION INTRAVENOUS at 08:59

## 2018-04-18 ASSESSMENT — PAIN SCALES - GENERAL
PAINLEVEL_OUTOF10: 0

## 2018-04-19 VITALS
TEMPERATURE: 98.1 F | DIASTOLIC BLOOD PRESSURE: 70 MMHG | RESPIRATION RATE: 16 BRPM | HEIGHT: 67 IN | HEART RATE: 71 BPM | SYSTOLIC BLOOD PRESSURE: 144 MMHG | BODY MASS INDEX: 19.9 KG/M2 | WEIGHT: 126.8 LBS | OXYGEN SATURATION: 99 %

## 2018-04-19 LAB
HCT VFR BLD CALC: 26.1 % (ref 37–54)
HEMOGLOBIN: 8.5 G/DL (ref 12.5–16.5)
METER GLUCOSE: 114 MG/DL (ref 70–110)
METER GLUCOSE: 141 MG/DL (ref 70–110)
METER GLUCOSE: 174 MG/DL (ref 70–110)

## 2018-04-19 PROCEDURE — 2580000003 HC RX 258: Performed by: INTERNAL MEDICINE

## 2018-04-19 PROCEDURE — 99233 SBSQ HOSP IP/OBS HIGH 50: CPT | Performed by: SURGERY

## 2018-04-19 PROCEDURE — 6370000000 HC RX 637 (ALT 250 FOR IP): Performed by: INTERNAL MEDICINE

## 2018-04-19 PROCEDURE — 85018 HEMOGLOBIN: CPT

## 2018-04-19 PROCEDURE — 82962 GLUCOSE BLOOD TEST: CPT

## 2018-04-19 PROCEDURE — 85014 HEMATOCRIT: CPT

## 2018-04-19 PROCEDURE — 36415 COLL VENOUS BLD VENIPUNCTURE: CPT

## 2018-04-19 RX ADMIN — INSULIN LISPRO 1 UNITS: 100 INJECTION, SOLUTION INTRAVENOUS; SUBCUTANEOUS at 17:16

## 2018-04-19 RX ADMIN — INSULIN LISPRO 1 UNITS: 100 INJECTION, SOLUTION INTRAVENOUS; SUBCUTANEOUS at 12:53

## 2018-04-19 RX ADMIN — Medication 10 ML: at 10:41

## 2018-04-19 RX ADMIN — LISINOPRIL 40 MG: 20 TABLET ORAL at 10:40

## 2018-04-19 RX ADMIN — ATENOLOL 25 MG: 25 TABLET ORAL at 10:39

## 2018-04-19 RX ADMIN — AMLODIPINE BESYLATE 10 MG: 10 TABLET ORAL at 10:39

## 2018-04-19 ASSESSMENT — PAIN SCALES - GENERAL
PAINLEVEL_OUTOF10: 0
PAINLEVEL_OUTOF10: 0

## 2020-03-17 ENCOUNTER — HOSPITAL ENCOUNTER (OUTPATIENT)
Age: 80
Setting detail: OBSERVATION
Discharge: NURSING, REHAB OR LONG-TERM CARE | End: 2020-03-20
Attending: EMERGENCY MEDICINE | Admitting: INTERNAL MEDICINE
Payer: MEDICARE

## 2020-03-17 PROCEDURE — 86850 RBC ANTIBODY SCREEN: CPT

## 2020-03-17 PROCEDURE — 85027 COMPLETE CBC AUTOMATED: CPT

## 2020-03-17 PROCEDURE — 86901 BLOOD TYPING SEROLOGIC RH(D): CPT

## 2020-03-17 PROCEDURE — 83605 ASSAY OF LACTIC ACID: CPT

## 2020-03-17 PROCEDURE — 85730 THROMBOPLASTIN TIME PARTIAL: CPT

## 2020-03-17 PROCEDURE — 87040 BLOOD CULTURE FOR BACTERIA: CPT

## 2020-03-17 PROCEDURE — 83880 ASSAY OF NATRIURETIC PEPTIDE: CPT

## 2020-03-17 PROCEDURE — 93005 ELECTROCARDIOGRAM TRACING: CPT | Performed by: EMERGENCY MEDICINE

## 2020-03-17 PROCEDURE — 99285 EMERGENCY DEPT VISIT HI MDM: CPT

## 2020-03-17 PROCEDURE — 83690 ASSAY OF LIPASE: CPT

## 2020-03-17 PROCEDURE — 84484 ASSAY OF TROPONIN QUANT: CPT

## 2020-03-17 PROCEDURE — 85610 PROTHROMBIN TIME: CPT

## 2020-03-17 PROCEDURE — 80053 COMPREHEN METABOLIC PANEL: CPT

## 2020-03-17 PROCEDURE — 36415 COLL VENOUS BLD VENIPUNCTURE: CPT

## 2020-03-17 PROCEDURE — 86900 BLOOD TYPING SEROLOGIC ABO: CPT

## 2020-03-17 RX ORDER — SODIUM CHLORIDE 9 MG/ML
INJECTION, SOLUTION INTRAVENOUS CONTINUOUS
Status: DISCONTINUED | OUTPATIENT
Start: 2020-03-17 | End: 2020-03-20 | Stop reason: HOSPADM

## 2020-03-17 RX ORDER — PANTOPRAZOLE SODIUM 40 MG/10ML
40 INJECTION, POWDER, LYOPHILIZED, FOR SOLUTION INTRAVENOUS ONCE
Status: COMPLETED | OUTPATIENT
Start: 2020-03-18 | End: 2020-03-18

## 2020-03-18 ENCOUNTER — APPOINTMENT (OUTPATIENT)
Dept: CT IMAGING | Age: 80
End: 2020-03-18
Payer: MEDICARE

## 2020-03-18 ENCOUNTER — APPOINTMENT (OUTPATIENT)
Dept: GENERAL RADIOLOGY | Age: 80
End: 2020-03-18
Payer: MEDICARE

## 2020-03-18 PROBLEM — R10.13 EPIGASTRIC PAIN: Status: ACTIVE | Noted: 2020-03-18

## 2020-03-18 LAB
ABO/RH: NORMAL
ALBUMIN SERPL-MCNC: 4.1 G/DL (ref 3.5–5.2)
ALP BLD-CCNC: 106 U/L (ref 40–129)
ALT SERPL-CCNC: 7 U/L (ref 0–40)
ANION GAP SERPL CALCULATED.3IONS-SCNC: 14 MMOL/L (ref 7–16)
ANTIBODY SCREEN: NORMAL
APTT: 29 SEC (ref 24.5–35.1)
AST SERPL-CCNC: 12 U/L (ref 0–39)
BACTERIA: NORMAL /HPF
BILIRUB SERPL-MCNC: <0.2 MG/DL (ref 0–1.2)
BILIRUBIN URINE: NEGATIVE
BLOOD, URINE: ABNORMAL
BUN BLDV-MCNC: 40 MG/DL (ref 8–23)
CALCIUM SERPL-MCNC: 9.1 MG/DL (ref 8.6–10.2)
CHLORIDE BLD-SCNC: 101 MMOL/L (ref 98–107)
CLARITY: CLEAR
CO2: 19 MMOL/L (ref 22–29)
COLOR: YELLOW
CREAT SERPL-MCNC: 1.8 MG/DL (ref 0.7–1.2)
EKG ATRIAL RATE: 96 BPM
EKG ATRIAL RATE: 99 BPM
EKG P AXIS: 47 DEGREES
EKG P AXIS: 53 DEGREES
EKG P-R INTERVAL: 162 MS
EKG P-R INTERVAL: 172 MS
EKG Q-T INTERVAL: 348 MS
EKG Q-T INTERVAL: 352 MS
EKG QRS DURATION: 92 MS
EKG QRS DURATION: 96 MS
EKG QTC CALCULATION (BAZETT): 444 MS
EKG QTC CALCULATION (BAZETT): 446 MS
EKG R AXIS: 34 DEGREES
EKG R AXIS: 41 DEGREES
EKG T AXIS: 104 DEGREES
EKG T AXIS: 113 DEGREES
EKG VENTRICULAR RATE: 96 BPM
EKG VENTRICULAR RATE: 99 BPM
GFR AFRICAN AMERICAN: 44
GFR NON-AFRICAN AMERICAN: 37 ML/MIN/1.73
GLUCOSE BLD-MCNC: 296 MG/DL (ref 74–99)
GLUCOSE URINE: 250 MG/DL
HCT VFR BLD CALC: 27.5 % (ref 37–54)
HEMOGLOBIN: 8.3 G/DL (ref 12.5–16.5)
INR BLD: 1
KETONES, URINE: NEGATIVE MG/DL
LACTIC ACID: 2.1 MMOL/L (ref 0.5–2.2)
LEUKOCYTE ESTERASE, URINE: NEGATIVE
LIPASE: 36 U/L (ref 13–60)
MCH RBC QN AUTO: 24.9 PG (ref 26–35)
MCHC RBC AUTO-ENTMCNC: 30.2 % (ref 32–34.5)
MCV RBC AUTO: 82.6 FL (ref 80–99.9)
METER GLUCOSE: 112 MG/DL (ref 74–99)
METER GLUCOSE: 163 MG/DL (ref 74–99)
METER GLUCOSE: 69 MG/DL (ref 74–99)
METER GLUCOSE: 70 MG/DL (ref 74–99)
NITRITE, URINE: NEGATIVE
PDW BLD-RTO: 16.2 FL (ref 11.5–15)
PH UA: 7 (ref 5–9)
PLATELET # BLD: 351 E9/L (ref 130–450)
PMV BLD AUTO: 9.6 FL (ref 7–12)
POTASSIUM SERPL-SCNC: 4.7 MMOL/L (ref 3.5–5)
PRO-BNP: 1730 PG/ML (ref 0–450)
PROTEIN UA: 100 MG/DL
PROTHROMBIN TIME: 11.8 SEC (ref 9.3–12.4)
RBC # BLD: 3.33 E12/L (ref 3.8–5.8)
RBC UA: NORMAL /HPF (ref 0–2)
SODIUM BLD-SCNC: 134 MMOL/L (ref 132–146)
SPECIFIC GRAVITY UA: 1.02 (ref 1–1.03)
TOTAL PROTEIN: 7.5 G/DL (ref 6.4–8.3)
TROPONIN: 0.06 NG/ML (ref 0–0.03)
UROBILINOGEN, URINE: 0.2 E.U./DL
WBC # BLD: 8.3 E9/L (ref 4.5–11.5)
WBC UA: NORMAL /HPF (ref 0–5)

## 2020-03-18 PROCEDURE — 74176 CT ABD & PELVIS W/O CONTRAST: CPT

## 2020-03-18 PROCEDURE — G0378 HOSPITAL OBSERVATION PER HR: HCPCS

## 2020-03-18 PROCEDURE — 97530 THERAPEUTIC ACTIVITIES: CPT

## 2020-03-18 PROCEDURE — 96374 THER/PROPH/DIAG INJ IV PUSH: CPT

## 2020-03-18 PROCEDURE — 97535 SELF CARE MNGMENT TRAINING: CPT

## 2020-03-18 PROCEDURE — 81001 URINALYSIS AUTO W/SCOPE: CPT

## 2020-03-18 PROCEDURE — 2580000003 HC RX 258: Performed by: EMERGENCY MEDICINE

## 2020-03-18 PROCEDURE — 6370000000 HC RX 637 (ALT 250 FOR IP): Performed by: INTERNAL MEDICINE

## 2020-03-18 PROCEDURE — C9113 INJ PANTOPRAZOLE SODIUM, VIA: HCPCS | Performed by: EMERGENCY MEDICINE

## 2020-03-18 PROCEDURE — 71046 X-RAY EXAM CHEST 2 VIEWS: CPT

## 2020-03-18 PROCEDURE — 97162 PT EVAL MOD COMPLEX 30 MIN: CPT

## 2020-03-18 PROCEDURE — 6370000000 HC RX 637 (ALT 250 FOR IP): Performed by: EMERGENCY MEDICINE

## 2020-03-18 PROCEDURE — 6360000002 HC RX W HCPCS: Performed by: EMERGENCY MEDICINE

## 2020-03-18 PROCEDURE — 82962 GLUCOSE BLOOD TEST: CPT

## 2020-03-18 PROCEDURE — 93005 ELECTROCARDIOGRAM TRACING: CPT | Performed by: EMERGENCY MEDICINE

## 2020-03-18 PROCEDURE — 97166 OT EVAL MOD COMPLEX 45 MIN: CPT

## 2020-03-18 RX ORDER — HYDRALAZINE HYDROCHLORIDE 20 MG/ML
5 INJECTION INTRAMUSCULAR; INTRAVENOUS ONCE
Status: DISCONTINUED | OUTPATIENT
Start: 2020-03-18 | End: 2020-03-20 | Stop reason: HOSPADM

## 2020-03-18 RX ORDER — SODIUM CHLORIDE 0.9 % (FLUSH) 0.9 %
10 SYRINGE (ML) INJECTION PRN
Status: DISCONTINUED | OUTPATIENT
Start: 2020-03-18 | End: 2020-03-20 | Stop reason: HOSPADM

## 2020-03-18 RX ORDER — CLOPIDOGREL BISULFATE 75 MG/1
75 TABLET ORAL DAILY
Status: DISCONTINUED | OUTPATIENT
Start: 2020-03-18 | End: 2020-03-20 | Stop reason: HOSPADM

## 2020-03-18 RX ORDER — GLIPIZIDE 5 MG/1
5 TABLET ORAL
Status: DISCONTINUED | OUTPATIENT
Start: 2020-03-18 | End: 2020-03-20 | Stop reason: HOSPADM

## 2020-03-18 RX ORDER — NICOTINE 21 MG/24HR
1 PATCH, TRANSDERMAL 24 HOURS TRANSDERMAL DAILY
Status: DISCONTINUED | OUTPATIENT
Start: 2020-03-18 | End: 2020-03-20 | Stop reason: HOSPADM

## 2020-03-18 RX ORDER — ATENOLOL 50 MG/1
50 TABLET ORAL DAILY
Status: DISCONTINUED | OUTPATIENT
Start: 2020-03-18 | End: 2020-03-20 | Stop reason: HOSPADM

## 2020-03-18 RX ORDER — ATORVASTATIN CALCIUM 20 MG/1
20 TABLET, FILM COATED ORAL DAILY
Status: DISCONTINUED | OUTPATIENT
Start: 2020-03-18 | End: 2020-03-20 | Stop reason: HOSPADM

## 2020-03-18 RX ORDER — LISINOPRIL 20 MG/1
40 TABLET ORAL DAILY
Status: DISCONTINUED | OUTPATIENT
Start: 2020-03-18 | End: 2020-03-20 | Stop reason: HOSPADM

## 2020-03-18 RX ORDER — PANTOPRAZOLE SODIUM 40 MG/1
40 TABLET, DELAYED RELEASE ORAL 2 TIMES DAILY
Status: DISCONTINUED | OUTPATIENT
Start: 2020-03-18 | End: 2020-03-20 | Stop reason: HOSPADM

## 2020-03-18 RX ORDER — ACETAMINOPHEN 325 MG/1
650 TABLET ORAL EVERY 4 HOURS PRN
Status: DISCONTINUED | OUTPATIENT
Start: 2020-03-18 | End: 2020-03-20 | Stop reason: HOSPADM

## 2020-03-18 RX ORDER — GEMFIBROZIL 600 MG/1
600 TABLET, FILM COATED ORAL
Status: DISCONTINUED | OUTPATIENT
Start: 2020-03-18 | End: 2020-03-20 | Stop reason: HOSPADM

## 2020-03-18 RX ORDER — SODIUM CHLORIDE 0.9 % (FLUSH) 0.9 %
10 SYRINGE (ML) INJECTION EVERY 12 HOURS SCHEDULED
Status: DISCONTINUED | OUTPATIENT
Start: 2020-03-18 | End: 2020-03-20 | Stop reason: HOSPADM

## 2020-03-18 RX ORDER — LISINOPRIL 5 MG/1
5 TABLET ORAL DAILY
Status: ON HOLD | COMMUNITY
End: 2020-03-18

## 2020-03-18 RX ORDER — ATORVASTATIN CALCIUM 20 MG/1
20 TABLET, FILM COATED ORAL DAILY
Status: DISCONTINUED | OUTPATIENT
Start: 2020-03-18 | End: 2020-03-18

## 2020-03-18 RX ADMIN — ATORVASTATIN CALCIUM 20 MG: 20 TABLET, FILM COATED ORAL at 21:20

## 2020-03-18 RX ADMIN — PANTOPRAZOLE SODIUM 40 MG: 40 TABLET, DELAYED RELEASE ORAL at 09:52

## 2020-03-18 RX ADMIN — METFORMIN HYDROCHLORIDE 1000 MG: 1000 TABLET ORAL at 09:53

## 2020-03-18 RX ADMIN — CLOPIDOGREL 75 MG: 75 TABLET, FILM COATED ORAL at 09:53

## 2020-03-18 RX ADMIN — ACETAMINOPHEN 650 MG: 325 TABLET ORAL at 21:20

## 2020-03-18 RX ADMIN — SODIUM CHLORIDE: 9 INJECTION, SOLUTION INTRAVENOUS at 06:14

## 2020-03-18 RX ADMIN — LIDOCAINE HYDROCHLORIDE: 20 SOLUTION ORAL; TOPICAL at 00:12

## 2020-03-18 RX ADMIN — ACETAMINOPHEN 650 MG: 325 TABLET ORAL at 16:54

## 2020-03-18 RX ADMIN — GEMFIBROZIL 600 MG: 600 TABLET ORAL at 16:54

## 2020-03-18 RX ADMIN — SODIUM CHLORIDE, PRESERVATIVE FREE 10 ML: 5 INJECTION INTRAVENOUS at 09:53

## 2020-03-18 RX ADMIN — PANTOPRAZOLE SODIUM 40 MG: 40 TABLET, DELAYED RELEASE ORAL at 21:20

## 2020-03-18 RX ADMIN — GLIPIZIDE 5 MG: 5 TABLET ORAL at 09:52

## 2020-03-18 RX ADMIN — PANTOPRAZOLE SODIUM 40 MG: 40 INJECTION, POWDER, FOR SOLUTION INTRAVENOUS at 00:12

## 2020-03-18 RX ADMIN — METFORMIN HYDROCHLORIDE 1000 MG: 1000 TABLET ORAL at 21:20

## 2020-03-18 RX ADMIN — ATENOLOL 50 MG: 50 TABLET ORAL at 09:53

## 2020-03-18 RX ADMIN — LISINOPRIL 40 MG: 20 TABLET ORAL at 09:52

## 2020-03-18 ASSESSMENT — ENCOUNTER SYMPTOMS
NAUSEA: 1
VOMITING: 0
SORE THROAT: 0
ABDOMINAL PAIN: 1
COUGH: 0
ABDOMINAL DISTENTION: 0
BACK PAIN: 0
CONSTIPATION: 0
WHEEZING: 0
DIARRHEA: 0
EYE DISCHARGE: 0
SHORTNESS OF BREATH: 0
BLOOD IN STOOL: 0
EYE PAIN: 0
EYE REDNESS: 0
SINUS PRESSURE: 0
RHINORRHEA: 0

## 2020-03-18 ASSESSMENT — PAIN DESCRIPTION - PROGRESSION: CLINICAL_PROGRESSION: GRADUALLY WORSENING

## 2020-03-18 ASSESSMENT — PAIN DESCRIPTION - ONSET: ONSET: ON-GOING

## 2020-03-18 ASSESSMENT — PAIN SCALES - GENERAL
PAINLEVEL_OUTOF10: 4
PAINLEVEL_OUTOF10: 6
PAINLEVEL_OUTOF10: 3
PAINLEVEL_OUTOF10: 7
PAINLEVEL_OUTOF10: 0
PAINLEVEL_OUTOF10: 0

## 2020-03-18 ASSESSMENT — PAIN DESCRIPTION - LOCATION
LOCATION: SHOULDER
LOCATION: SHOULDER

## 2020-03-18 ASSESSMENT — PAIN DESCRIPTION - DESCRIPTORS: DESCRIPTORS: ACHING;DISCOMFORT

## 2020-03-18 ASSESSMENT — PAIN DESCRIPTION - ORIENTATION
ORIENTATION: LEFT
ORIENTATION: LEFT

## 2020-03-18 ASSESSMENT — PAIN DESCRIPTION - PAIN TYPE: TYPE: CHRONIC PAIN

## 2020-03-18 ASSESSMENT — PAIN - FUNCTIONAL ASSESSMENT: PAIN_FUNCTIONAL_ASSESSMENT: PREVENTS OR INTERFERES SOME ACTIVE ACTIVITIES AND ADLS

## 2020-03-18 NOTE — PROGRESS NOTES
Physical Therapy  Physical Therapy Initial Assessment     Name: Maritza Almanzar  : 1940  MRN: [id number]    Referring Provider: Rome Cardoza MD    Date of Service: 3/18/2020    Evaluating PT: Yorkfani Noble, PT, DPT, [id number]    Room #: 8052/5191-K  Diagnosis: Epigastric pain  PMHx/PSHx: DM, OA, HTN, chronic back pain, bedbugs  Precautions: Fall risk, bedbugs, alarm    SUBJECTIVE:    Pt presented to the ED with visible bed bugs and was placed in decontamination room upon arrival. Pt lives alone in a 2 story house with 2 stair(s) and 0 rail(s) to enter. Bed is on first floor and bath is on second floor. Full flight of stairs and 1 rail to second floor. Pt ambulated with [facility] or  prior to admission. OBJECTIVE:   Initial Evaluation  Date: 3/18/20 Treatment Date: Short Term/ Long Term   Goals   AM-PAC 6 Clicks      Was pt agreeable to Eval/treatment? Yes     Does pt have pain? 2-3/10 L shoulder pain     Bed Mobility  Rolling: SBA  Supine to sit: SBA  Sit to supine: SBA  Scooting: SBA to EOB  Rolling: Independent   Supine to sit: Independent   Sit to supine: Independent   Scooting: Independent    Transfers Sit to stand: Min A  Stand to sit: Min A  Stand pivot: Mod A with [employer]  Sit to stand: Supervision  Stand to sit: Supervision  Stand pivot: SBA with [employer]   Ambulation   20 feet x2 with [employer] with Mod A  200 feet with [employer] with SBA   Stair negotiation: ascended and descended NT  4 step(s) with 2 rail(s) with SBA     ROM BUE: Refer to OT note  BLE: WFL     Strength BUE: Refer to OT note  BLE: NT     Balance Sitting EOB: SBA  Dynamic Standing: Mod A with [employer]  Sitting EOB: Independent   Dynamic Standing: SBA with [employer]     Pt is A & O x: 4 to person, place, month/year, and situation. Sensation: Pt reports R hand numbness and tingling. Edema: Unremarkable. Patient education  Pt educated on [employer] technique/approximation.     Patient response to education:   Pt verbalized understanding Pt demonstrated skill Pt requires further education in this area   Yes With cueing/assistance Yes     ASSESSMENT:    Comments:   Pt was supine in bed upon room entry, agreeable to PT evaluation. Pt is oriented but can be delayed and uses quick/impulsive movements at times. Pt was on 1 L O2/min and O2 sat was 98% at rest. Pt was removed from O2 and O2 sat remained at 97% on RA; pt completed all mobility on RA. Pt ambulates with slow gait speed and mild unsteadiness. Pt had difficulty managing WW in close quarters and would often bump into objects; assistance required. Pt's L LE is externally rotated and frequently crossed midline when ambulating. Pt required increased assistance for [employer] management when pivoting/turning in bathroom. Pt performed transfers from commode using L grab bar. Pt ambulated back to EOB and was seated/assisted to supine position. O2 sat was 89% on RA following activity but quickly recovered to 95% with seated rest. Pt remained on RA following session; RN notified. Pt was left supine in bed with all needs met at conclusion of session. CM and SW notified of pt's performance. Treatment:  Patient practiced and was instructed in the following treatment:     Therapeutic activities: Pt completed all therapeutic activities noted above. Pt was cued for hand placement during all transfers. Pt completed transfers from multiple surfaces of varying heights. Pt was cued for [employer] approximation and technique when ambulating. Pt was cued for safety with all mobility due to pt's tendency move quickly and impulsively. Pt's/family goals:   1. To return home. Patient and or family understand(s) diagnosis, prognosis, and plan of care. Yes. PLAN:    PT care will be provided in accordance with the objectives noted above. Exercises and functional mobility practice will be used as well as appropriate assistive devices or modalities to obtain goals. Patient and family education will also be administered as needed.     Frequency of treatments: 2-5x/week x 2-3 days. Time in: 1350  Time out: 1410    Total Treatment Time 15 minutes     Evaluation Time includes thorough review of current medical information, gathering information on past medical history/social history and prior level of function, completion of standardized testing/informal observation of tasks, assessment of data and education on plan of care and goals.     CPT codes:  [] Low Complexity PT evaluation 21896  [x] Moderate Complexity PT evaluation 03254  [] High Complexity PT evaluation 36307  [] PT Re-evaluation 64310  [] Gait training 50678 0 minutes  [] Manual therapy 57633 0 minutes  [x] Therapeutic activities 26900 15 minutes  [] Therapeutic exercises 90046 0 minutes  [] Neuromuscular reeducation 05137 0 minutes     Francia Ortiz, PT, DPT  [id number]

## 2020-03-18 NOTE — PROGRESS NOTES
OCCUPATIONAL THERAPY INITIAL EVALUATION      Date:3/18/2020  Patient Name: Sarahy Quinn  MRN: [id number]  : 1940  Room: [address] [place] [address], R/L #[id number]    AM-PAC Daily Activity Raw Score: 15/24  Recommended Adaptive Equipment: TBD     Diagnosis: Epigastric pain [R10.13]     Pt presented to ED on 3/17/20 for worsening epigastric pain  Referring physician: Renzo Keller MD    Pertinent Medical History: chronic back pain, HLD, HTN, OA, DMII, occipital stroke    Precautions:  Falls, bed alarm, bedbugs (decontaminated in ED)     Home Living: Pt lives alone in 2 floor home. 2 RODOLFO, 0 handrail  Bath on 2nd floor - flight of stairs, 1 handrail.  Bed on 1st floor  Bathroom setup: tub/shower with shower chair   Equipment owned: w/w, cane    Prior Level of Function: independent/mod I with ADLs , assistance with IADLs (by family); ambulated w/ w/w or  cane  Driving: no - family assists    Pain Level: Pt c/o 2-3/10 L shoulder pain this session; reinforced pain management strategies    Cognition: A&O: 4/4; Follows 1 step directions intermittently - occasionally required cues to redirect and focus on tasks   Memory: good   Sequencing:  fair    Problem solving:  fair -   Judgement/safety:  fair -     Functional Assessment:   Initial Eval Status  Date: 3/18/20 Treatment Status  Date: Short Term Goals/LTG  Treatment frequency: 1-4x/wk   Feeding Supervision   Modified [place]    Grooming Minimal Assist   Standing at sink  Modified [place]    UB Dressing Minimal Assist   Modified [place]    LB Dressing Moderate Assist   Pants, socks  Stand by Assist    Bathing Moderate Assist  Stand by Assist    Toileting Moderate Assist   Including hygiene  Stand by Assist    Bed Mobility  Rolling: Stand by Assist   Supine to sit: Stand by Assist   Sit to supine: Stand by Assist   Rolling: Independent   Supine to sit: Modified [place]   Sit to supine: Modified [place]    Functional Transfers independence and quality of life. mod  Profile and History- med (extensive chart review)  Assessment of Occupational Performance and Identification of Deficits- med  Clinical Decision Making- med    Evaluation time includes thorough review of current medical information, gathering information on past medical history/social history and prior level of function, completion of standardized testing/informal observation of tasks, assessment of data, and development of POC/Goals. Assessment of current deficits  Functional mobility [x]  ADLs [x] Strength [x]  Cognition [x]  Functional transfers  [x] IADLs [] Safety Awareness [x]  Endurance [x]  Fine Motor Coordination [] Balance [x] Vision/perception [] Sensation []   Gross Motor Coordination [] ROM [] Delirium []                  Motor Control []    Plan of Care:  5-7 days  ADL retraining [x]   Equipment needs [x]   Neuromuscular re-education [x] Energy Conservation Techniques [x]  Functional Transfer training [x] Patient and/or Family Education [x]  Functional Mobility training [x]  Environmental Modifications [x]  Cognitive re-training [x]   Compensatory techniques for ADLs [x]  Splinting Needs []   Positioning to improve overall function [x]   Therapeutic Activity [x]  Therapeutic Exercise  [x]  Visual/Perceptual: []    Delirium prevention/treatment  []   Other:  []    Rehab Potential: Good for established goals    Patient / Family Goal: Not stated     Patient and/or family were instructed on diagnosis, prognosis/goals and plan of care. Demonstrated fair- understanding. [] Malnutrition indicators have been identified and nursing has been notified to ensure a dietitian consult is ordered.        Mod Evaluation completed +    Treatment Time In:14:10            Treatment Time Out:14:20              Treatment Charges: Mins Units   Ther Ex  18677     Manual Therapy Frank Marcus [phone]     ADL/Home Mgt [id number]   Neuro Re-ed 49514     Group Therapy

## 2020-03-18 NOTE — PROGRESS NOTES
Feels better This Am   No more pain no nausea   Abdomen soft not tender   Will start diet monitor if tolerated with no pain discharge later today

## 2020-03-18 NOTE — PROGRESS NOTES
Call placed to pharmacy for verify home medications ,[email or web address]

## 2020-03-18 NOTE — ED PROVIDER NOTES
HPI  This is a 77-year-old male with a PMHx significant for hypertension, hyperlipidemia, type 2 diabetes mellitus, GERD, occipital stroke and spinal stenosis who presents with 24 hours of acutely worsening epigastric pain. The patient states that he has chronic baseline abdominal pain, but that it has acutely worsened over the last 24 hours. He states that the change occurred suddenly and he was in such pain that when he spoke with his son on the phone, his son hung up and called 911 to come in  the patient. The patient states that nothing seems to make the pain better or worse, \"it is just always there. \"  He states he has not taken anything for his discomfort. The patient denies recent trauma, fever, chills, fatigue, HA, dizziness, vision changes, congestion, rhinorrhea, sore throat, neck pain, chest pain, palpitations, hx of MI, SOB, cough, wheezing, abdominal pain, N/V/D/C, hematochezia, melena, dysuria, hematuria, generalized weakness and paresthesias. The patient is currently taking Plavix. The patient is a current 2 pack/day smoker. He denies alcohol and illicit drug use. .    The history is provided by the patient who is a questionable historian. **Patient with visible bedbugs on arrival and was initially placed in the decontamination room. **    Review of Systems   Constitutional: Negative for chills, diaphoresis, fatigue and fever. HENT: Negative for congestion, ear pain, postnasal drip, rhinorrhea, sinus pressure and sore throat. Eyes: Negative for pain, discharge and redness. Respiratory: Negative for cough, shortness of breath and wheezing. Cardiovascular: Positive for leg swelling (Bilateral). Negative for chest pain and palpitations. Gastrointestinal: Positive for abdominal pain (Epigastric) and nausea (Intermittent). Negative for abdominal distention, blood in stool, constipation, diarrhea and vomiting. Genitourinary: Negative for dysuria and frequency. Musculoskeletal: Negative for arthralgias, back pain, myalgias and neck pain. Skin: Negative for rash and wound. Neurological: Negative for dizziness, syncope, weakness, light-headedness, numbness and headaches. Hematological: Negative for adenopathy. All other systems reviewed and are negative. Physical Exam  Vitals signs and nursing note reviewed. Constitutional:       General: He is not in acute distress. Appearance: He is well-developed. He is not diaphoretic. HENT:      Head: Normocephalic and atraumatic. Right Ear: External ear normal.      Left Ear: External ear normal.      Nose: Nose normal. No congestion or rhinorrhea. Mouth/Throat:      Mouth: Mucous membranes are moist.      Pharynx: Oropharynx is clear. Eyes:      Pupils: Pupils are equal, round, and reactive to light. Neck:      Musculoskeletal: Normal range of motion and neck supple. Cardiovascular:      Rate and Rhythm: Normal rate and regular rhythm. Heart sounds: Normal heart sounds. No murmur. Pulmonary:      Effort: Pulmonary effort is normal. No respiratory distress. Breath sounds: Normal breath sounds. No wheezing or rales. Abdominal:      General: Bowel sounds are normal.      Palpations: Abdomen is soft. Tenderness: There is no abdominal tenderness. There is no guarding or rebound. Skin:     General: Skin is warm and dry. Neurological:      Mental Status: He is alert and oriented to person, place, and time. Cranial Nerves: No cranial nerve deficit. Coordination: Coordination normal.          Procedures:  No procedures performed. --------------------------------------------- PAST HISTORY ---------------------------------------------  Past Medical History:  has a past medical history of Chronic back pain, Hyperlipidemia, Hypertension, Osteoarthritis, and Type II or unspecified type diabetes mellitus without mention of complication, not stated as uncontrolled.     Past Surgical History:  has a past surgical history that includes Spine surgery (2002); Carotid endarterectomy (2008); Endoscopy, colon, diagnostic (02/05/2017); Colonoscopy; Upper gastrointestinal endoscopy (N/A, 4/14/2018); Colonoscopy (N/A, 4/16/2018); and Colonoscopy (4/16/2018). Social History:  reports that he has been smoking cigarettes. He has been smoking about 2.00 packs per day. He has never used smokeless tobacco. He reports that he does not drink alcohol or use drugs. Family History: family history is not on file. The patients home medications have been reviewed.     Allergies: Sulfa antibiotics    -------------------------------------------------- RESULTS -------------------------------------------------    LABS:  Results for orders placed or performed during the hospital encounter of 03/17/20   CBC   Result Value Ref Range    WBC 8.3 4.5 - 11.5 E9/L    RBC 3.33 (L) 3.80 - 5.80 E12/L    Hemoglobin 8.3 (L) 12.5 - 16.5 g/dL    Hematocrit 27.5 (L) 37.0 - 54.0 %    MCV 82.6 80.0 - 99.9 fL    MCH 24.9 (L) 26.0 - 35.0 pg    MCHC 30.2 (L) 32.0 - 34.5 %    RDW 16.2 (H) 11.5 - 15.0 fL    Platelets 721 929 - 055 E9/L    MPV 9.6 7.0 - 12.0 fL   Comprehensive Metabolic Panel   Result Value Ref Range    Sodium 134 132 - 146 mmol/L    Potassium 4.7 3.5 - 5.0 mmol/L    Chloride 101 98 - 107 mmol/L    CO2 19 (L) 22 - 29 mmol/L    Anion Gap 14 7 - 16 mmol/L    Glucose 296 (H) 74 - 99 mg/dL    BUN 40 (H) 8 - 23 mg/dL    CREATININE 1.8 (H) 0.7 - 1.2 mg/dL    GFR Non-African American 37 >=60 mL/min/1.73    GFR African American 44     Calcium 9.1 8.6 - 10.2 mg/dL    Total Protein 7.5 6.4 - 8.3 g/dL    Alb 4.1 3.5 - 5.2 g/dL    Total Bilirubin <0.2 0.0 - 1.2 mg/dL    Alkaline Phosphatase 106 40 - 129 U/L    ALT 7 0 - 40 U/L    AST 12 0 - 39 U/L   Lipase   Result Value Ref Range    Lipase 36 13 - 60 U/L   Lactic Acid, Plasma   Result Value Ref Range    Lactic Acid 2.1 0.5 - 2.2 mmol/L   Urinalysis   Result Value Ref Range    Color, UA Yellow Straw/Yellow    Clarity, UA Clear Clear    Glucose, Ur 250 (A) Negative mg/dL    Bilirubin Urine Negative Negative    Ketones, Urine Negative Negative mg/dL    Specific Gravity, UA 1.020 1.005 - 1.030    Blood, Urine TRACE-INTACT Negative    pH, UA 7.0 5.0 - 9.0    Protein,  (A) Negative mg/dL    Urobilinogen, Urine 0.2 <2.0 E.U./dL    Nitrite, Urine Negative Negative    Leukocyte Esterase, Urine Negative Negative   Troponin   Result Value Ref Range    Troponin 0.06 (H) 0.00 - 0.03 ng/mL   Brain Natriuretic Peptide   Result Value Ref Range    Pro-BNP 1,730 (H) 0 - 450 pg/mL   Protime-INR   Result Value Ref Range    Protime 11.8 9.3 - 12.4 sec    INR 1.0    APTT   Result Value Ref Range    aPTT 29.0 24.5 - 35.1 sec   Microscopic Urinalysis   Result Value Ref Range    WBC, UA NONE 0 - 5 /HPF    RBC, UA 0-1 0 - 2 /HPF    Bacteria, UA NONE SEEN None Seen /HPF   EKG 12 Lead   Result Value Ref Range    Ventricular Rate 99 BPM    Atrial Rate 99 BPM    P-R Interval 172 ms    QRS Duration 96 ms    Q-T Interval 348 ms    QTc Calculation (Bazett) 446 ms    P Axis 53 degrees    R Axis 41 degrees    T Axis 113 degrees   TYPE AND SCREEN   Result Value Ref Range    ABO/Rh O POS     Antibody Screen NEG        EKG: Interpreted by ER physician  Rate: 99 bpm  Rhythm: Sinus with occasional PACs  Axis: normal  ST Segments: depression in  v3, v4, v5, v6, I and aVl  T-Waves: no acute change  Interpretation: Abnormal EKG  Comparison: stable as compared to patient's most recent EKG on 4/14/2018    RADIOLOGY:  CT ABDOMEN PELVIS WO CONTRAST Additional Contrast? None   Final Result   No evidence of acute intrabdominal pathology. Non-emergent findings, as above.          This report has been electronically signed by Rosmery Reardon MD.      XR CHEST STANDARD (2 VW)    (Results Pending)           ------------------------- NURSING NOTES AND VITALS REVIEWED ---------------------------  Date / Time Roomed:  3/17/2020 11:21 PM  ED Bed Assignment:  MONICA/MONICA    The nursing notes within the ED encounter and vital signs as below have been reviewed. Patient Vitals for the past 24 hrs:   BP Temp Temp src Pulse Resp SpO2 Height Weight   03/18/20 0240 (!) 188/100 98 °F (36.7 °C) Oral 84 16 100 % -- --   03/18/20 0058 (!) 157/118 98.5 °F (36.9 °C) -- 106 18 97 % -- --   03/17/20 2341 (!) 171/99 -- -- 98 18 98 % 5' 7\" (1.702 m) 170 lb (77.1 kg)       Oxygen Saturation Interpretation: Normal        --------------------------------- PROGRESS NOTES / ADDITIONAL PROVIDER NOTES ---------------------------------  Consultations:  As outlined below. ED Course:    ED Course as of Mar 18 0438   Tue Mar 17, 2020   2345 This resident in to evaluate the patient. [ML]   Wed Mar 18, 2020   0320 Case discussed with Dr. Betsy Titus who agrees to admit the patient for observation and likely GI work-up. [ML]   0401 Patient was hemoccult negative. [ML]      ED Course User Index  [ML] Harshad Trujillo, [address] [facility] [address]: All results were discussed with the patient and I have provided specific details regarding the plan to admit the patient. The patient was stable at the time of admission and was without objective evidence of hemodynamic instability. He was seen in the emergency department by myself and the assigned attending physician, Dr. Amaryllis Severin, who agreed with the assessment, plan and decision to admit as laid out herein. The patient verbalized an understanding and agreement with the plan for admission. All questions were answered and the patient was deemed to be in stable condition at the time of transport. MDM:  Patient presented from home with acute on chronic abdominal pain with significant worsening over the last 24 hours. On arrival, the patient was hypertensive and tachycardic with remaining vital signs within normal limits. Physical exam was as above.     DDX included, but was not limited to: GERD, PUD +/- perforation, pancreatitis, cholecystitis, appendicitis, mesenteric ischemia, ACS, pneumonia, PE, pneumothorax, diverticulosis/diverticulitis, other GI bleed    Given the patient's clinical presentation, history and exam, the decision was made to further evaluate the patient's complaint with EKG, labs and imaging. The patient's work-up revealed an elevated proBNP in addition to his chronically low hemoglobin, chronically elevated troponin and CKD. The patient was given a GI cocktail, Protonix and hydralazine while in the ER. Based on his unrelenting abdominal pain and history of gastrointestinal disease/bleeding, the decision was made to admit the patient to the hospital for further evaluation and management. Of note, the patient's last EGD and colonoscopy were in 2018 and significant findings were documented in post procedure notes. This patient's ED course included: a personal history and physicial examination, re-evaluation prior to disposition, multiple bedside re-evaluations, IV medications, cardiac monitoring and continuous pulse oximetry    Diagnosis:  1. Abdominal pain, epigastric    2. Abnormal EKG        Disposition:  Patient's disposition: Admit to telemetry  Patient's condition is stable. Tenisha Joya DO  Resident  03/18/20 0425  ATTENDING PROVIDER ATTESTATION:     I have personally performed and/or participated in the history, exam, medical decision making, and procedures and agree with all pertinent clinical information. I have also reviewed and agree with the past medical, family and social history unless otherwise noted. I have discussed this patient in detail with the resident, and provided the instruction and education regarding abdominal pain. My findings/Plan: Please note I was the primary provider for patient patient presenting because of abdominal pain that is an ongoing issue but worsened over the last day. Patient reporting nausea.   Patient reporting no black or tarry stools he reports no hematemesis. Patient reporting some lower chest pains for me on my exam.  Patient reporting no radiation of pain. He reports no shortness of breath. Patient reporting no headache. Patient is awake alert oriented patient heart and lung exam normal abdomen he is tender mainly upper abdomen there is no lower abdominal tenderness patient moves his upper extremities without difficulty also moves lower extremities. Patient labs noted and EKG noted patient does have ST depression laterally it appears to be more prominent compared to his prior EKGs. Patient medicated here for his abdominal pain still had persistent pain. CT abdomen pelvis was done and noted and reviewed. Patient chest x-ray also noted showed cardiomegaly with vascular congestion. Patient still had pain. Patient had also a repeat EKG while here in the emergency department it really did not show any new findings.   Patient PCP was consulted due to his persistent pain and patient will be admitted to the hospital.  Patient made aware of findings and admission       Sury Jerome MD  03/18/20 [address] [place], [place]  03/18/20 6764

## 2020-03-18 NOTE — CARE COORDINATION
Call received from [facility] , patient's  with [facility], [phone]. Per Deepa Christopher patient's home is very unclean and has been treated for bed bugs previously. She and the family do not believe the patient is safe to return home at discharge. Explained that the patient is here under Observation for abdominal pain, that we would have therapy evaluate the patient, however that would be a big determinant in the transition of care plans. She contiued stating she has been attempting to get help into his home or services in to clean the home, however due to the bed bug contamination, she is unable to get him services at this time. APS has also been consulted re: patient's living conditions but no determinations have been made at this time. Deepa Christopher stated the patient's sister and patient's son are very concerned about the patient returning home and have started the [facility] application process for the patient. Patient's sister in [facility], [phone] and son is Donna Winn [phone] is assisting in application process for Medicaid, but lives in [place]. Call then received from the patient's sister Guy Klein. She states the patient is unable to care for himself as he cannot move well. She states the patient is unable to do stairs and therefor unable to bathe properly. Explained we would have therapy evaluate and assist in determining an appropriate transition of care plan. Guy Klein is also very insistent we apply for medicaid for the patient. Explained that since the patient has a Managed medicare plan, we would not be applying for Medicaid for the patient in the hospital.  Explained that Deepa Christophre had stated that she, the sister, and the patient's son had been working on the application process. Julio Cgirish Ernie stated the patient's son was working on it from [place].   Explained that we would wait to see how the patient does with therapy and then would discuss appropriate

## 2020-03-18 NOTE — CARE COORDINATION
Spoke with therapy. Patient could benefit from therapy prior to returning home. Call placed to the patient's sister Margarito Neely re: above recommendations. Chemo lives in [place] but would like the patient to stay in [address] [place] [address]. [place] list for the [place] area reviewed with Chemo over the phone. She is comfortable with either [facility] or [facility]. She has no preference as to which facility she would like first, second, or third. Call placed to [facility], liaison with [facility] with referral and detailed VM left. Call also placed to [facility] with Lola Cabezas and [facility] with referral.  Both will review the case and let us know if they can accept. Await acceptance for rehab. Saad Zaidi.    The Plan for Transition of Care is related to the following treatment goals: Improve functional mobility to return home. The Patient and/or patient representative, sister Margarito Neely was provided with a choice of provider and agrees   with the discharge plan. [x] Yes [] No    Freedom of choice list was provided with basic dialogue that supports the patient's individualized plan of care/goals, treatment preferences and shares the quality data associated with the providers.  [x] Yes [] No

## 2020-03-18 NOTE — PROGRESS NOTES
Pt reports that he does not know what medications he takes and that he will call home in the morning and let staff know what they are.

## 2020-03-19 LAB
METER GLUCOSE: 102 MG/DL (ref 74–99)
METER GLUCOSE: 128 MG/DL (ref 74–99)

## 2020-03-19 PROCEDURE — G0378 HOSPITAL OBSERVATION PER HR: HCPCS

## 2020-03-19 PROCEDURE — 6370000000 HC RX 637 (ALT 250 FOR IP): Performed by: INTERNAL MEDICINE

## 2020-03-19 PROCEDURE — 2580000003 HC RX 258: Performed by: EMERGENCY MEDICINE

## 2020-03-19 PROCEDURE — 82962 GLUCOSE BLOOD TEST: CPT

## 2020-03-19 PROCEDURE — 6370000000 HC RX 637 (ALT 250 FOR IP): Performed by: EMERGENCY MEDICINE

## 2020-03-19 RX ORDER — GLIPIZIDE 5 MG/1
5 TABLET ORAL
Qty: 60 TABLET | Refills: 3 | Status: ON HOLD
Start: 2020-03-20 | End: 2020-12-10 | Stop reason: HOSPADM

## 2020-03-19 RX ORDER — ATORVASTATIN CALCIUM 20 MG/1
20 TABLET, FILM COATED ORAL DAILY
Qty: 30 TABLET | Refills: 3 | Status: SHIPPED | OUTPATIENT
Start: 2020-03-19

## 2020-03-19 RX ORDER — GEMFIBROZIL 600 MG/1
600 TABLET, FILM COATED ORAL
Qty: 60 TABLET | Refills: 3 | Status: SHIPPED | OUTPATIENT
Start: 2020-03-19 | End: 2020-12-02

## 2020-03-19 RX ADMIN — LISINOPRIL 40 MG: 20 TABLET ORAL at 09:31

## 2020-03-19 RX ADMIN — METFORMIN HYDROCHLORIDE 1000 MG: 1000 TABLET ORAL at 09:31

## 2020-03-19 RX ADMIN — SODIUM CHLORIDE: 9 INJECTION, SOLUTION INTRAVENOUS at 20:04

## 2020-03-19 RX ADMIN — ACETAMINOPHEN 650 MG: 325 TABLET ORAL at 16:14

## 2020-03-19 RX ADMIN — PANTOPRAZOLE SODIUM 40 MG: 40 TABLET, DELAYED RELEASE ORAL at 20:00

## 2020-03-19 RX ADMIN — ACETAMINOPHEN 650 MG: 325 TABLET ORAL at 01:55

## 2020-03-19 RX ADMIN — ATORVASTATIN CALCIUM 20 MG: 20 TABLET, FILM COATED ORAL at 20:00

## 2020-03-19 RX ADMIN — ATENOLOL 50 MG: 50 TABLET ORAL at 09:31

## 2020-03-19 RX ADMIN — SODIUM CHLORIDE, PRESERVATIVE FREE 10 ML: 5 INJECTION INTRAVENOUS at 09:31

## 2020-03-19 RX ADMIN — GEMFIBROZIL 600 MG: 600 TABLET ORAL at 06:28

## 2020-03-19 RX ADMIN — GLIPIZIDE 5 MG: 5 TABLET ORAL at 06:28

## 2020-03-19 RX ADMIN — SODIUM CHLORIDE: 9 INJECTION, SOLUTION INTRAVENOUS at 06:30

## 2020-03-19 RX ADMIN — ACETAMINOPHEN 650 MG: 325 TABLET ORAL at 21:42

## 2020-03-19 RX ADMIN — GEMFIBROZIL 600 MG: 600 TABLET ORAL at 16:14

## 2020-03-19 RX ADMIN — PANTOPRAZOLE SODIUM 40 MG: 40 TABLET, DELAYED RELEASE ORAL at 06:28

## 2020-03-19 RX ADMIN — CLOPIDOGREL 75 MG: 75 TABLET, FILM COATED ORAL at 09:31

## 2020-03-19 RX ADMIN — METFORMIN HYDROCHLORIDE 1000 MG: 1000 TABLET ORAL at 20:00

## 2020-03-19 ASSESSMENT — PAIN DESCRIPTION - PROGRESSION
CLINICAL_PROGRESSION: GRADUALLY WORSENING
CLINICAL_PROGRESSION: GRADUALLY WORSENING

## 2020-03-19 ASSESSMENT — PAIN SCALES - GENERAL
PAINLEVEL_OUTOF10: 5
PAINLEVEL_OUTOF10: 5
PAINLEVEL_OUTOF10: 0
PAINLEVEL_OUTOF10: 5
PAINLEVEL_OUTOF10: 8

## 2020-03-19 ASSESSMENT — PAIN DESCRIPTION - LOCATION: LOCATION: SHOULDER

## 2020-03-19 ASSESSMENT — PAIN DESCRIPTION - FREQUENCY: FREQUENCY: CONTINUOUS

## 2020-03-19 ASSESSMENT — PAIN DESCRIPTION - ORIENTATION: ORIENTATION: LEFT

## 2020-03-19 ASSESSMENT — PAIN DESCRIPTION - ONSET: ONSET: PROGRESSIVE

## 2020-03-19 ASSESSMENT — PAIN DESCRIPTION - DESCRIPTORS: DESCRIPTORS: ACHING;CONSTANT;DISCOMFORT

## 2020-03-19 ASSESSMENT — PAIN DESCRIPTION - PAIN TYPE: TYPE: CHRONIC PAIN

## 2020-03-19 NOTE — DISCHARGE INSTR - COC
Continuity of Care Form    Patient Name: Jayro Zee   :  1940  MRN:  [id number]    Admit date:  3/17/2020  Discharge date:  3/20/2020    Code Status Order: Full Code   Advance Directives:   Advance Care Flowsheet Documentation     Date/Time Healthcare Directive Type of Healthcare Directive Copy in [address] Agent's Name Healthcare Agent's Phone Number    [phone]  No, patient does not have an advance directive for healthcare treatment -- -- -- -- --          Admitting Physician:  Ernestina Blair MD  PCP: Ernestina Blair MD    Discharging Nurse: [facility] Unit/Room#: 3173/3799-L  Discharging Unit Phone Number: [phone]     Emergency Contact:   Extended Emergency Contact Information  Primary Emergency Contact: Wilian Ramos Dr   [address] Phone: [phone]  Relation: Brother/Sister  Secondary Emergency Contact: Claudia Mckeon  Address: [address] Phone: [phone]  Relation: Child    Past Surgical History:  Past Surgical History:   Procedure Laterality Date    CAROTID ENDARTERECTOMY  2008    COLONOSCOPY      COLONOSCOPY N/A 2018    COLONOSCOPY WITH BIOPSY performed by Judge Wally MD at [place] [address]  2018    COLONOSCOPY CONTROL HEMORRHAGE performed by Judge Wally MD at [address] [place] [address]  2017    SPINE SURGERY  2002    by Dr Luís Herrera N/A 2018    EGD BIOPSY performed by Dieudonne Andrews DO at [facility] History:   Immunization History   Administered Date(s) Administered    Tdap (Boostrix, Adacel) 2016       Active Problems:  Patient Active Problem List   Diagnosis Code    Back pain M54.9    Spinal stenosis, lumbar region, without neurogenic claudication M48.061    Radiculopathy, lumbar region M54.16    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    Occipital stroke (HCC) I63.9    HTN (hypertension) I10    Type 2 diabetes mellitus (HCC) E11.9    Chest pain R07.9    Iron deficiency anemia due to chronic blood loss D50.0    Anemia D64.9    Epigastric pain R10.13       Isolation/Infection:   Isolation          No Isolation        Patient Infection Status     None to display          Nurse Assessment:  Last Vital Signs: /67   Pulse 72   Temp 98.7 °F (37.1 °C) (Oral)   Resp 16   Ht 5' 7\" (1.702 m)   Wt 170 lb (77.1 kg)   SpO2 96%   BMI 26.63 kg/m²     Last documented pain score (0-10 scale): Pain Level: 5  Last Weight:   Wt Readings from Last 1 Encounters:   03/17/20 170 lb (77.1 kg)     Mental Status:  oriented, alert, coherent, logical, thought processes intact and able to concentrate and follow conversation    IV Access:  - None    Nursing Mobility/ADLs:  Walking   Assisted  Transfer  Assisted  Bathing  Assisted  Dressing  Assisted  Toileting  Assisted  Feeding  Assisted  Med Admin  Assisted  Med Delivery   whole    Wound Care Documentation and Therapy:        Elimination:  Continence:   · Bowel: yes  · Bladder: Yes  Urinary Catheter: None   Colostomy/Ileostomy/Ileal Conduit: No       Date of Last BM: 3/18/2020    Intake/Output Summary (Last 24 hours) at 3/19/2020 0730  Last data filed at 3/18/2020 2206  Gross per 24 hour   Intake 420 ml   Output 700 ml   Net -280 ml     I/O last 3 completed shifts: In: 5 [P.O.:420]  Out: 700 [Urine:700]    Safety Concerns:     None    Impairments/Disabilities:      None    Nutrition Therapy:  Current Nutrition Therapy:Generali diet, regular liquids    Routes of Feeding: Oral  Liquids: Thin Liquids  Daily Fluid Restriction: no  Last Modified Barium Swallow with Video (Video Swallowing Test): not done    Treatments at the Time of Hospital Discharge:   Respiratory Treatments:   Oxygen Therapy:  is not on home oxygen therapy.   Ventilator:    - No ventilator support    Rehab

## 2020-03-19 NOTE — CARE COORDINATION
Discharge orders received. Call placed to [facility] to arrange transportation. Ambulance transport arranged for 5pm pick-up. Call placed to the patient's sister Bryon Maloney to notify of transition of care. Patient to transition to [facility] today.      Luis Alberto Helms.

## 2020-03-19 NOTE — PROGRESS NOTES
Nurse to nurse called to [facility] a spoke with     Discharge paperwork faxed to facility and and spoke with [place]

## 2020-03-19 NOTE — CARE COORDINATION
Return call received from [facility], they have accepted the patient and will initiate authorization with the patient's insurance. CAIO completed and faxed to [facility] at [phone] and placed copy in envelope with transfer forms in the soft chart. Anticipate discharge to [facility] today. Call placed to patient's sister Agus Aranda to notify of acceptance. She would like called with transition time when available. Facility information also provided to Agus Aranda. Call placed to the patient's  at [facility] Thuy Herring to notify of above. She is also in agreement and will follow at the facility. Anticipate discharge to [facility] today.      Judah Henderson.

## 2020-03-19 NOTE — PROGRESS NOTES
Admit Date: 3/17/2020    Subjective:     Feels better abdominal pain resolved chronic back pain     Objective:     No data found. I/O last 3 completed shifts: In: 2266 [P.O.:660; I.V.:1606]  Out: 700 [Urine:700]  No intake/output data recorded. HEENT: Normal  NECK: Thyroid normal. No carotid bruit. No lymphphadenopathy. CVS: RRR  RS: Clear. No wheeze. No rhonchi. Good airflow bilaterally. ABD: Soft. Non tender. No mass. Normal BS. EXT: No edema. Non tender.    NEURO: no focal deficit       Scheduled Meds:   sodium chloride flush  10 mL Intravenous 2 times per day    hydrALAZINE  5 mg Intravenous Once    atenolol  50 mg Oral Daily    clopidogrel  75 mg Oral Daily    gemfibrozil  600 mg Oral BID AC    glipiZIDE  5 mg Oral QAM AC    lisinopril  40 mg Oral Daily    metFORMIN  1,000 mg Oral BID    nicotine  1 patch Transdermal Daily    pantoprazole  40 mg Oral BID    atorvastatin  20 mg Oral Daily     Continuous Infusions:   sodium chloride 100 mL/hr at 03/19/20 0630       CBC with Differential:    Lab Results   Component Value Date    WBC 8.3 03/17/2020    RBC 3.33 03/17/2020    HGB 8.3 03/17/2020    HCT 27.5 03/17/2020     03/17/2020    MCV 82.6 03/17/2020    MCH 24.9 03/17/2020    MCHC 30.2 03/17/2020    RDW 16.2 03/17/2020    LYMPHOPCT 13.3 04/14/2018    MONOPCT 8.3 04/14/2018    BASOPCT 0.4 04/14/2018    MONOSABS 0.86 04/14/2018    LYMPHSABS 1.38 04/14/2018    EOSABS 0.40 04/14/2018    BASOSABS 0.04 04/14/2018     CMP:    Lab Results   Component Value Date     03/17/2020    K 4.7 03/17/2020     03/17/2020    CO2 19 03/17/2020    BUN 40 03/17/2020    CREATININE 1.8 03/17/2020    GFRAA 44 03/17/2020    LABGLOM 37 03/17/2020    PROT 7.5 03/17/2020    LABALBU 4.1 03/17/2020    CALCIUM 9.1 03/17/2020    BILITOT <0.2 03/17/2020    ALKPHOS 106 03/17/2020    AST 12 03/17/2020    ALT 7 03/17/2020     PT/INR:    Lab Results   Component Value Date    PROTIME 11.8 03/17/2020    INR 1.0 03/17/2020       Assessment:     Active Problems:    Epigastric pain resolved     CKD    DM    HTN    Chronic back pain    PVD    Smoker     Inability to manage at home       Plan:   Await placement

## 2020-03-20 VITALS
HEART RATE: 86 BPM | DIASTOLIC BLOOD PRESSURE: 72 MMHG | RESPIRATION RATE: 18 BRPM | OXYGEN SATURATION: 98 % | BODY MASS INDEX: 26.68 KG/M2 | HEIGHT: 67 IN | WEIGHT: 170 LBS | TEMPERATURE: 98.7 F | SYSTOLIC BLOOD PRESSURE: 168 MMHG

## 2020-03-20 LAB — METER GLUCOSE: 157 MG/DL (ref 74–99)

## 2020-03-20 PROCEDURE — 2580000003 HC RX 258: Performed by: EMERGENCY MEDICINE

## 2020-03-20 PROCEDURE — 6370000000 HC RX 637 (ALT 250 FOR IP): Performed by: EMERGENCY MEDICINE

## 2020-03-20 PROCEDURE — G0378 HOSPITAL OBSERVATION PER HR: HCPCS

## 2020-03-20 PROCEDURE — 6370000000 HC RX 637 (ALT 250 FOR IP): Performed by: INTERNAL MEDICINE

## 2020-03-20 PROCEDURE — 82962 GLUCOSE BLOOD TEST: CPT

## 2020-03-20 RX ADMIN — ATENOLOL 50 MG: 50 TABLET ORAL at 09:24

## 2020-03-20 RX ADMIN — GLIPIZIDE 5 MG: 5 TABLET ORAL at 06:29

## 2020-03-20 RX ADMIN — ACETAMINOPHEN 650 MG: 325 TABLET ORAL at 02:02

## 2020-03-20 RX ADMIN — LISINOPRIL 40 MG: 20 TABLET ORAL at 09:24

## 2020-03-20 RX ADMIN — SODIUM CHLORIDE: 9 INJECTION, SOLUTION INTRAVENOUS at 04:49

## 2020-03-20 RX ADMIN — PANTOPRAZOLE SODIUM 40 MG: 40 TABLET, DELAYED RELEASE ORAL at 09:24

## 2020-03-20 RX ADMIN — CLOPIDOGREL 75 MG: 75 TABLET, FILM COATED ORAL at 09:24

## 2020-03-20 RX ADMIN — METFORMIN HYDROCHLORIDE 1000 MG: 1000 TABLET ORAL at 09:24

## 2020-03-20 RX ADMIN — ACETAMINOPHEN 650 MG: 325 TABLET ORAL at 06:29

## 2020-03-20 RX ADMIN — GEMFIBROZIL 600 MG: 600 TABLET ORAL at 06:29

## 2020-03-20 ASSESSMENT — PAIN SCALES - GENERAL
PAINLEVEL_OUTOF10: 7
PAINLEVEL_OUTOF10: 7

## 2020-03-20 NOTE — CARE COORDINATION
Late call received from Niyah Daigle, liaison with [facility]. After a positive Flu A was reported, they needed to make some room adjustments and would not have them completed this evening. Transport canceled and call placed to charge nurse Papa Chang to notify. Spoke with Niyah Daigle this morning. They are ready for the patient to transition today. [facility] ambulance transportation arranged for 1pm pick-up. Charge nurse and bedside nurse notified. Sweat suit obtained from the spiritual care office for the patient to discharge in. Patient to transition to rehab today.      Shahnaz Kelley.

## 2020-03-22 NOTE — DISCHARGE SUMMARY
traMADol (ULTRAM) 50 MG tablet Comments:   Reason for Stopping:         simvastatin (ZOCOR) 20 MG tablet Comments:   Reason for Stopping:         glipiZIDE (GLUCOTROL) 5 MG CR tablet Comments:   Reason for Stopping:         amLODIPine (NORVASC) 10 MG tablet Comments:   Reason for Stopping:             Activity: activity as tolerated  Diet: regular diet  Wound Care: none needed    Signed:  Rome Cardoza MD.  3/22/2020  10:29 AM

## 2020-03-23 LAB
BLOOD CULTURE, ROUTINE: NORMAL
CULTURE, BLOOD 2: NORMAL

## 2020-04-06 ENCOUNTER — APPOINTMENT (OUTPATIENT)
Dept: CT IMAGING | Age: 80
End: 2020-04-06
Payer: MEDICARE

## 2020-04-06 ENCOUNTER — APPOINTMENT (OUTPATIENT)
Dept: GENERAL RADIOLOGY | Age: 80
End: 2020-04-06
Payer: MEDICARE

## 2020-04-06 ENCOUNTER — HOSPITAL ENCOUNTER (EMERGENCY)
Age: 80
Discharge: HOME | End: 2020-04-07
Attending: EMERGENCY MEDICINE
Payer: MEDICARE

## 2020-04-06 LAB
ABO/RH: NORMAL
ADENOVIRUS BY PCR: NOT DETECTED
ALBUMIN SERPL-MCNC: 3.3 G/DL (ref 3.5–5.2)
ALP BLD-CCNC: 81 U/L (ref 40–129)
ALT SERPL-CCNC: 11 U/L (ref 0–40)
ANION GAP SERPL CALCULATED.3IONS-SCNC: 11 MMOL/L (ref 7–16)
ANTIBODY SCREEN: NORMAL
AST SERPL-CCNC: 33 U/L (ref 0–39)
BACTERIA: ABNORMAL /HPF
BASOPHILS ABSOLUTE: 0.06 E9/L (ref 0–0.2)
BASOPHILS RELATIVE PERCENT: 0.7 % (ref 0–2)
BILIRUB SERPL-MCNC: <0.2 MG/DL (ref 0–1.2)
BILIRUBIN URINE: NEGATIVE
BLOOD, URINE: NEGATIVE
BORDETELLA PARAPERTUSSIS BY PCR: NOT DETECTED
BORDETELLA PERTUSSIS BY PCR: NOT DETECTED
BUN BLDV-MCNC: 34 MG/DL (ref 8–23)
CALCIUM SERPL-MCNC: 8.4 MG/DL (ref 8.6–10.2)
CHLAMYDOPHILIA PNEUMONIAE BY PCR: NOT DETECTED
CHLORIDE BLD-SCNC: 109 MMOL/L (ref 98–107)
CLARITY: CLEAR
CO2: 19 MMOL/L (ref 22–29)
COLOR: YELLOW
CORONAVIRUS 229E BY PCR: NOT DETECTED
CORONAVIRUS HKU1 BY PCR: NOT DETECTED
CORONAVIRUS NL63 BY PCR: NOT DETECTED
CORONAVIRUS OC43 BY PCR: NOT DETECTED
CREAT SERPL-MCNC: 2 MG/DL (ref 0.7–1.2)
EOSINOPHILS ABSOLUTE: 0.8 E9/L (ref 0.05–0.5)
EOSINOPHILS RELATIVE PERCENT: 9.1 % (ref 0–6)
GFR AFRICAN AMERICAN: 39
GFR NON-AFRICAN AMERICAN: 32 ML/MIN/1.73
GLUCOSE BLD-MCNC: 88 MG/DL (ref 74–99)
GLUCOSE URINE: NEGATIVE MG/DL
HCT VFR BLD CALC: 22.6 % (ref 37–54)
HEMOGLOBIN: 6.6 G/DL (ref 12.5–16.5)
HUMAN METAPNEUMOVIRUS BY PCR: NOT DETECTED
HUMAN RHINOVIRUS/ENTEROVIRUS BY PCR: NOT DETECTED
IMMATURE GRANULOCYTES #: 0.06 E9/L
IMMATURE GRANULOCYTES %: 0.7 % (ref 0–5)
INFLUENZA A BY PCR: NOT DETECTED
INFLUENZA B BY PCR: NOT DETECTED
INR BLD: 1.2
KETONES, URINE: NEGATIVE MG/DL
LACTIC ACID: 1.5 MMOL/L (ref 0.5–2.2)
LEUKOCYTE ESTERASE, URINE: NEGATIVE
LIPASE: 23 U/L (ref 13–60)
LYMPHOCYTES ABSOLUTE: 1.73 E9/L (ref 1.5–4)
LYMPHOCYTES RELATIVE PERCENT: 19.6 % (ref 20–42)
MCH RBC QN AUTO: 24 PG (ref 26–35)
MCHC RBC AUTO-ENTMCNC: 29.2 % (ref 32–34.5)
MCV RBC AUTO: 82.2 FL (ref 80–99.9)
MONOCYTES ABSOLUTE: 0.84 E9/L (ref 0.1–0.95)
MONOCYTES RELATIVE PERCENT: 9.5 % (ref 2–12)
MYCOPLASMA PNEUMONIAE BY PCR: NOT DETECTED
NEUTROPHILS ABSOLUTE: 5.33 E9/L (ref 1.8–7.3)
NEUTROPHILS RELATIVE PERCENT: 60.4 % (ref 43–80)
NITRITE, URINE: NEGATIVE
PARAINFLUENZA VIRUS 1 BY PCR: NOT DETECTED
PARAINFLUENZA VIRUS 2 BY PCR: NOT DETECTED
PARAINFLUENZA VIRUS 3 BY PCR: NOT DETECTED
PARAINFLUENZA VIRUS 4 BY PCR: NOT DETECTED
PDW BLD-RTO: 16 FL (ref 11.5–15)
PH UA: 5.5 (ref 5–9)
PLATELET # BLD: 283 E9/L (ref 130–450)
PMV BLD AUTO: 9.9 FL (ref 7–12)
POTASSIUM SERPL-SCNC: 4.7 MMOL/L (ref 3.5–5)
POTASSIUM SERPL-SCNC: 6 MMOL/L (ref 3.5–5)
POTASSIUM SERPL-SCNC: 6 MMOL/L (ref 3.5–5)
PROCALCITONIN: 0.07 NG/ML (ref 0–0.08)
PROTEIN UA: 100 MG/DL
PROTHROMBIN TIME: 13.1 SEC (ref 9.3–12.4)
RBC # BLD: 2.75 E12/L (ref 3.8–5.8)
RBC UA: ABNORMAL /HPF (ref 0–2)
RESPIRATORY SYNCYTIAL VIRUS BY PCR: NOT DETECTED
SODIUM BLD-SCNC: 139 MMOL/L (ref 132–146)
SPECIFIC GRAVITY UA: 1.02 (ref 1–1.03)
TOTAL PROTEIN: 6.6 G/DL (ref 6.4–8.3)
TROPONIN: 0.22 NG/ML (ref 0–0.03)
UROBILINOGEN, URINE: 0.2 E.U./DL
WBC # BLD: 8.8 E9/L (ref 4.5–11.5)
WBC UA: ABNORMAL /HPF (ref 0–5)

## 2020-04-06 PROCEDURE — 84484 ASSAY OF TROPONIN QUANT: CPT

## 2020-04-06 PROCEDURE — 6360000002 HC RX W HCPCS: Performed by: EMERGENCY MEDICINE

## 2020-04-06 PROCEDURE — 80053 COMPREHEN METABOLIC PANEL: CPT

## 2020-04-06 PROCEDURE — 86900 BLOOD TYPING SEROLOGIC ABO: CPT

## 2020-04-06 PROCEDURE — 83690 ASSAY OF LIPASE: CPT

## 2020-04-06 PROCEDURE — 0100U HC RESPIRPTHGN MULT REV TRANS & AMP PRB TECH 21 TRGT: CPT

## 2020-04-06 PROCEDURE — 96375 TX/PRO/DX INJ NEW DRUG ADDON: CPT

## 2020-04-06 PROCEDURE — 71045 X-RAY EXAM CHEST 1 VIEW: CPT

## 2020-04-06 PROCEDURE — 84145 PROCALCITONIN (PCT): CPT

## 2020-04-06 PROCEDURE — 86923 COMPATIBILITY TEST ELECTRIC: CPT

## 2020-04-06 PROCEDURE — 86850 RBC ANTIBODY SCREEN: CPT

## 2020-04-06 PROCEDURE — 96365 THER/PROPH/DIAG IV INF INIT: CPT

## 2020-04-06 PROCEDURE — 81001 URINALYSIS AUTO W/SCOPE: CPT

## 2020-04-06 PROCEDURE — 86901 BLOOD TYPING SEROLOGIC RH(D): CPT

## 2020-04-06 PROCEDURE — 87088 URINE BACTERIA CULTURE: CPT

## 2020-04-06 PROCEDURE — 2580000003 HC RX 258: Performed by: EMERGENCY MEDICINE

## 2020-04-06 PROCEDURE — P9016 RBC LEUKOCYTES REDUCED: HCPCS

## 2020-04-06 PROCEDURE — 84132 ASSAY OF SERUM POTASSIUM: CPT

## 2020-04-06 PROCEDURE — 99285 EMERGENCY DEPT VISIT HI MDM: CPT

## 2020-04-06 PROCEDURE — 83605 ASSAY OF LACTIC ACID: CPT

## 2020-04-06 PROCEDURE — 96366 THER/PROPH/DIAG IV INF ADDON: CPT

## 2020-04-06 PROCEDURE — C9113 INJ PANTOPRAZOLE SODIUM, VIA: HCPCS | Performed by: EMERGENCY MEDICINE

## 2020-04-06 PROCEDURE — 71250 CT THORAX DX C-: CPT

## 2020-04-06 PROCEDURE — 93005 ELECTROCARDIOGRAM TRACING: CPT | Performed by: EMERGENCY MEDICINE

## 2020-04-06 PROCEDURE — 87040 BLOOD CULTURE FOR BACTERIA: CPT

## 2020-04-06 PROCEDURE — 85025 COMPLETE CBC W/AUTO DIFF WBC: CPT

## 2020-04-06 PROCEDURE — 36430 TRANSFUSION BLD/BLD COMPNT: CPT

## 2020-04-06 PROCEDURE — 85610 PROTHROMBIN TIME: CPT

## 2020-04-06 RX ORDER — PANTOPRAZOLE SODIUM 40 MG/10ML
40 INJECTION, POWDER, LYOPHILIZED, FOR SOLUTION INTRAVENOUS ONCE
Status: COMPLETED | OUTPATIENT
Start: 2020-04-06 | End: 2020-04-06

## 2020-04-06 RX ORDER — 0.9 % SODIUM CHLORIDE 0.9 %
250 INTRAVENOUS SOLUTION INTRAVENOUS ONCE
Status: COMPLETED | OUTPATIENT
Start: 2020-04-06 | End: 2020-04-07

## 2020-04-06 RX ADMIN — PANTOPRAZOLE SODIUM 40 MG: 40 INJECTION, POWDER, FOR SOLUTION INTRAVENOUS at 21:46

## 2020-04-06 RX ADMIN — CEFEPIME HYDROCHLORIDE 2 G: 2 INJECTION, POWDER, FOR SOLUTION INTRAVENOUS at 20:27

## 2020-04-06 ASSESSMENT — PAIN DESCRIPTION - FREQUENCY: FREQUENCY: INTERMITTENT

## 2020-04-06 ASSESSMENT — PAIN DESCRIPTION - PAIN TYPE: TYPE: ACUTE PAIN

## 2020-04-06 ASSESSMENT — PAIN DESCRIPTION - LOCATION: LOCATION: ABDOMEN

## 2020-04-06 ASSESSMENT — PAIN DESCRIPTION - ORIENTATION: ORIENTATION: UPPER

## 2020-04-06 ASSESSMENT — PAIN SCALES - GENERAL: PAINLEVEL_OUTOF10: 7

## 2020-04-06 NOTE — ED PROVIDER NOTES
UPPER GASTROINTESTINAL ENDOSCOPY N/A 4/14/2018    EGD BIOPSY performed by Casimiro Crump DO at [address] History:  reports that he has been smoking cigarettes. He has been smoking about 2.00 packs per day. He has never used smokeless tobacco. He reports that he does not drink alcohol or use drugs. Family History: family history is not on file. Allergies: Sulfa antibiotics    Physical Exam           ED Triage Vitals [04/06/20 1727]   BP Temp Temp Source Pulse Resp SpO2 Height Weight   (!) 206/102 97.7 °F (36.5 °C) Temporal 69 18 97 % 5' 7\" (1.702 m) 170 lb (77.1 kg)     Oxygen Saturation Interpretation: Normal.    Constitutional:  Alert, development consistent with age. HEENT:  NC/NT. Airway patent. Neck:  Normal ROM. Supple. Respiratory:  Clear to auscultation and breath sounds equal.  CV:  Regular rate and rhythm, normal heart sounds, without pathological murmurs, ectopy, gallops, or rubs. GI: Abdomen Soft, nontender, good bowel sounds. No firm or pulsatile mass. Back:  No costovertebral tenderness. Integument:  Normal turgor. Warm, dry, without visible rash, unless noted elsewhere. Lymphatic: no lymphadenopathy noted  Neurological:  Oriented. Motor functions intact.     Lab / Imaging Results   (All laboratory and radiology results have been personally reviewed by myself)  Labs:  Results for orders placed or performed during the hospital encounter of 04/06/20   Respiratory Panel, Molecular   Result Value Ref Range    Adenovirus by PCR Not Detected Not Detected    Bordetella parapertussis by PCR Not Detected Not Detected    Bordetella pertussis by PCR Not Detected Not Detected    Chlamydophilia pneumoniae by PCR Not Detected Not Detected    Coronavirus 229E by PCR Not Detected Not Detected    Coronavirus HKU1 by PCR Not Detected Not Detected    Coronavirus NL63 by PCR Not Detected Not Detected    Coronavirus OC43 by PCR Not Detected Not Detected    Human Metapneumovirus by PCR Not mg/dL    CREATININE 2.0 (H) 0.7 - 1.2 mg/dL    GFR Non-African American 32 >=60 mL/min/1.73    GFR African American 39     Calcium 8.4 (L) 8.6 - 10.2 mg/dL    Total Protein 6.6 6.4 - 8.3 g/dL    Alb 3.3 (L) 3.5 - 5.2 g/dL    Total Bilirubin <0.2 0.0 - 1.2 mg/dL    Alkaline Phosphatase 81 40 - 129 U/L    ALT 11 0 - 40 U/L    AST 33 0 - 39 U/L   Lactic Acid, Plasma   Result Value Ref Range    Lactic Acid 1.5 0.5 - 2.2 mmol/L   Urinalysis   Result Value Ref Range    Color, UA Yellow Straw/Yellow    Clarity, UA Clear Clear    Glucose, Ur Negative Negative mg/dL    Bilirubin Urine Negative Negative    Ketones, Urine Negative Negative mg/dL    Specific Gravity, UA 1.025 1.005 - 1.030    Blood, Urine Negative Negative    pH, UA 5.5 5.0 - 9.0    Protein,  (A) Negative mg/dL    Urobilinogen, Urine 0.2 <2.0 E.U./dL    Nitrite, Urine Negative Negative    Leukocyte Esterase, Urine Negative Negative   Lipase   Result Value Ref Range    Lipase 23 13 - 60 U/L   Procalcitonin   Result Value Ref Range    Procalcitonin 0.07 0.00 - 0.08 ng/mL   Troponin   Result Value Ref Range    Troponin 0.22 (H) 0.00 - 0.03 ng/mL   Protime-INR   Result Value Ref Range    Protime 13.1 (H) 9.3 - 12.4 sec    INR 1.2    Microscopic Urinalysis   Result Value Ref Range    WBC, UA NONE 0 - 5 /HPF    RBC, UA 0-1 0 - 2 /HPF    Bacteria, UA RARE (A) None Seen /HPF   Potassium   Result Value Ref Range    Potassium 6.0 (H) 3.5 - 5.0 mmol/L   Potassium   Result Value Ref Range    Potassium 4.7 3.5 - 5.0 mmol/L   Hemoglobin and Hematocrit, Blood   Result Value Ref Range    Hemoglobin 8.5 (L) 12.5 - 16.5 g/dL    Hematocrit 28.5 (L) 37.0 - 54.0 %   EKG 12 Lead   Result Value Ref Range    Ventricular Rate 62 BPM    Atrial Rate 62 BPM    P-R Interval 170 ms    QRS Duration 84 ms    Q-T Interval 422 ms    QTc Calculation (Bazett) 428 ms    P Axis 39 degrees    R Axis 14 degrees    T Axis 161 degrees   TYPE AND SCREEN   Result Value Ref Range    ABO/Rh O POS Antibody Screen NEG    PREPARE RBC (CROSSMATCH), 2 Units   Result Value Ref Range    Product Code Blood Bank [id number]     Description Blood Bank Red Blood Cells, Leuko-reduced     Unit Number [id number]     Dispense Status Blood Bank transfused     Product Code Blood Bank [id number]     Description Blood Bank Red Blood Cells, Apheresis, Leuko-reduced     Unit Number [id number]     Dispense Status Blood Bank transfused      Imaging: All Radiology results interpreted by Radiologist unless otherwise noted. CT CHEST WO CONTRAST   Final Result   1. Pattern of peripheral pulmonary fibrosis causing reticulation the   lung parenchyma peripherally and towards the lower lobes are.      2. Mild left-sided pleural effusion. Minimal pleural reaction on the   right. 3. Peripheral opacity with consolidation in the left lower lobe as   above commented. Requires further evaluation/investigation. See above   comments. 4. See above other comments and recommendations. XR CHEST PORTABLE   Final Result      Findings suggest pneumonia or atelectasis on background of chronic   interstitial lung changes. These findings appear similar when   correlated with prior chest radiograph from March 18, 2020. EKG #1:  Interpreted by emergency department physician unless otherwise noted. Time:  18:24    Rate: 62  Rhythm: Sinus. Interpretation: NSST changes. Comparison: stable as compared to patient's most recent EKG. ED Course / Medical Decision Making     Medications   cefepime (MAXIPIME) 2 g IVPB extended (mini-bag) (0 g Intravenous Stopped 4/7/20 0027)   0.9 % sodium chloride bolus (0 mLs Intravenous Stopped 4/7/20 0310)   pantoprazole (PROTONIX) injection 40 mg (40 mg Intravenous Given 4/6/20 2146)        Re-Evaluations:  4/6/20      Patients symptoms show no change.     Consultations:             IP CONSULT TO INTERNAL MEDICINE    Procedures:   none    MDM: Patient is currently being treated for pneumonia was sent in from nursing home for fever. He did receive Tylenol prior to arrival and was afebrile here in the ED. Septic work-up was pursued. I initially ordered him cefepime and vancomycin. Laboratory studies did indicate that he was anemic and also had elevation of his troponin. FOBT was negative. EKG had nonspecific ST changes which were present previously. Patient denies any chest pain or abdominal pain currently. He was given Protonix and a blood transfusion. Remaining of imaging and labs were unremarkable for any focal infection. No evidence of UTI or COVID. Case discussed with his PCP, Dr. Ruth Hoover. He would like the patient to be transfused and then sent back to the emergency department. He believes this could be secondary to his history of bleeding gastric ulcer. He will continue managing his pneumonia as an outpatient. Vancomycin had not yet been set up from pharmacy and therefore was canceled as patient is being discharged home on current antibiotic regimen. Counseling:   I have spoken with the patient and discussed todays results, in addition to providing specific details for the plan of care and counseling regarding the diagnosis and prognosis and are agreeable with the plan. Assessment      1. Anemia, unspecified type    2. History of peptic ulcer disease    3. Fever, unspecified fever cause      This patient's ED course included: a personal history and physicial examination    This patient has remained hemodynamically stable during their ED course. Plan   Discharge to nursing home. Patient condition is fair. New Medications     Discharge Medication List as of 4/6/2020 11:47 PM        Electronically signed by Gabriella Aburto DO   DD: 4/6/20  **This report was transcribed using voice recognition software. Every effort was made to ensure accuracy; however, inadvertent computerized transcription errors may be present.   END OF PROVIDER NOTE          Gabriella Aburto DO  04/08/20

## 2020-04-07 VITALS
WEIGHT: 170 LBS | TEMPERATURE: 98.1 F | SYSTOLIC BLOOD PRESSURE: 143 MMHG | OXYGEN SATURATION: 97 % | HEART RATE: 81 BPM | RESPIRATION RATE: 18 BRPM | DIASTOLIC BLOOD PRESSURE: 76 MMHG | BODY MASS INDEX: 26.68 KG/M2 | HEIGHT: 67 IN

## 2020-04-07 LAB
BLOOD BANK DISPENSE STATUS: NORMAL
BLOOD BANK DISPENSE STATUS: NORMAL
BLOOD BANK PRODUCT CODE: NORMAL
BLOOD BANK PRODUCT CODE: NORMAL
BPU ID: NORMAL
BPU ID: NORMAL
DESCRIPTION BLOOD BANK: NORMAL
DESCRIPTION BLOOD BANK: NORMAL
EKG ATRIAL RATE: 62 BPM
EKG P AXIS: 39 DEGREES
EKG P-R INTERVAL: 170 MS
EKG Q-T INTERVAL: 422 MS
EKG QRS DURATION: 84 MS
EKG QTC CALCULATION (BAZETT): 428 MS
EKG R AXIS: 14 DEGREES
EKG T AXIS: 161 DEGREES
EKG VENTRICULAR RATE: 62 BPM
HCT VFR BLD CALC: 28.5 % (ref 37–54)
HEMOGLOBIN: 8.5 G/DL (ref 12.5–16.5)

## 2020-04-07 PROCEDURE — 85014 HEMATOCRIT: CPT

## 2020-04-07 PROCEDURE — 36430 TRANSFUSION BLD/BLD COMPNT: CPT

## 2020-04-07 PROCEDURE — 85018 HEMOGLOBIN: CPT

## 2020-04-07 PROCEDURE — 93010 ELECTROCARDIOGRAM REPORT: CPT | Performed by: INTERNAL MEDICINE

## 2020-04-07 PROCEDURE — 36415 COLL VENOUS BLD VENIPUNCTURE: CPT

## 2020-04-07 PROCEDURE — 2580000003 HC RX 258: Performed by: EMERGENCY MEDICINE

## 2020-04-07 PROCEDURE — P9016 RBC LEUKOCYTES REDUCED: HCPCS

## 2020-04-07 RX ADMIN — SODIUM CHLORIDE 250 ML: 9 INJECTION, SOLUTION INTRAVENOUS at 00:22

## 2020-04-07 NOTE — ED NOTES
Nurse to nurse called to [address], spoke with Richard Kemp, she stated they are not contracted with anyone      Kody Chi RN  04/07/20 2080

## 2020-04-07 NOTE — ED NOTES
Lab called with critical results; HGB 6.6, attending aware     Sirena Phillip, IRAIDA  04/06/20 2001

## 2020-04-08 LAB — URINE CULTURE, ROUTINE: NORMAL

## 2020-04-11 LAB
BLOOD CULTURE, ROUTINE: NORMAL
CULTURE, BLOOD 2: NORMAL

## 2020-04-13 LAB
Lab: NORMAL
REPORT: NORMAL
SARS-COV-2: NOT DETECTED
THIS TEST SENT TO: NORMAL

## 2020-12-02 ENCOUNTER — HOSPITAL ENCOUNTER (INPATIENT)
Age: 80
LOS: 8 days | Discharge: NURSING, REHAB OR LONG-TERM CARE | DRG: 177 | End: 2020-12-10
Attending: EMERGENCY MEDICINE | Admitting: INTERNAL MEDICINE
Payer: COMMERCIAL

## 2020-12-02 ENCOUNTER — APPOINTMENT (OUTPATIENT)
Dept: CT IMAGING | Age: 80
DRG: 177 | End: 2020-12-02
Payer: COMMERCIAL

## 2020-12-02 PROBLEM — N17.9 AKI (ACUTE KIDNEY INJURY) (HCC): Status: ACTIVE | Noted: 2020-12-02

## 2020-12-02 LAB
ABO/RH: NORMAL
ADENOVIRUS BY PCR: NOT DETECTED
ALBUMIN SERPL-MCNC: 3.9 G/DL (ref 3.5–5.2)
ALP BLD-CCNC: 85 U/L (ref 40–129)
ALT SERPL-CCNC: 7 U/L (ref 0–40)
ANION GAP SERPL CALCULATED.3IONS-SCNC: 14 MMOL/L (ref 7–16)
ANTIBODY SCREEN: NORMAL
AST SERPL-CCNC: 10 U/L (ref 0–39)
BASOPHILS ABSOLUTE: 0.05 E9/L (ref 0–0.2)
BASOPHILS RELATIVE PERCENT: 0.7 % (ref 0–2)
BILIRUB SERPL-MCNC: <0.2 MG/DL (ref 0–1.2)
BORDETELLA PARAPERTUSSIS BY PCR: NOT DETECTED
BORDETELLA PERTUSSIS BY PCR: NOT DETECTED
BUN BLDV-MCNC: 49 MG/DL (ref 8–23)
CALCIUM SERPL-MCNC: 9.3 MG/DL (ref 8.6–10.2)
CHLAMYDOPHILIA PNEUMONIAE BY PCR: NOT DETECTED
CHLORIDE BLD-SCNC: 106 MMOL/L (ref 98–107)
CO2: 15 MMOL/L (ref 22–29)
CORONAVIRUS 229E BY PCR: NOT DETECTED
CORONAVIRUS HKU1 BY PCR: NOT DETECTED
CORONAVIRUS NL63 BY PCR: NOT DETECTED
CORONAVIRUS OC43 BY PCR: NOT DETECTED
CREAT SERPL-MCNC: 3.1 MG/DL (ref 0.7–1.2)
EKG ATRIAL RATE: 66 BPM
EKG P AXIS: 37 DEGREES
EKG P-R INTERVAL: 170 MS
EKG Q-T INTERVAL: 376 MS
EKG QRS DURATION: 70 MS
EKG QTC CALCULATION (BAZETT): 394 MS
EKG R AXIS: -6 DEGREES
EKG T AXIS: 72 DEGREES
EKG VENTRICULAR RATE: 66 BPM
EOSINOPHILS ABSOLUTE: 0.47 E9/L (ref 0.05–0.5)
EOSINOPHILS RELATIVE PERCENT: 6.2 % (ref 0–6)
GFR AFRICAN AMERICAN: 24
GFR NON-AFRICAN AMERICAN: 19 ML/MIN/1.73
GLUCOSE BLD-MCNC: 35 MG/DL (ref 74–99)
GLUCOSE BLD-MCNC: 90 MG/DL
HCT VFR BLD CALC: 29.1 % (ref 37–54)
HEMOGLOBIN: 9.5 G/DL (ref 12.5–16.5)
HUMAN METAPNEUMOVIRUS BY PCR: NOT DETECTED
HUMAN RHINOVIRUS/ENTEROVIRUS BY PCR: NOT DETECTED
IMMATURE GRANULOCYTES #: 0.2 E9/L
IMMATURE GRANULOCYTES %: 2.6 % (ref 0–5)
INFLUENZA A BY PCR: NOT DETECTED
INFLUENZA B BY PCR: NOT DETECTED
LACTIC ACID: 1.8 MMOL/L (ref 0.5–2.2)
LYMPHOCYTES ABSOLUTE: 1.26 E9/L (ref 1.5–4)
LYMPHOCYTES RELATIVE PERCENT: 16.5 % (ref 20–42)
MCH RBC QN AUTO: 31.3 PG (ref 26–35)
MCHC RBC AUTO-ENTMCNC: 32.6 % (ref 32–34.5)
MCV RBC AUTO: 95.7 FL (ref 80–99.9)
METER GLUCOSE: 112 MG/DL (ref 74–99)
METER GLUCOSE: 55 MG/DL (ref 74–99)
METER GLUCOSE: 90 MG/DL (ref 74–99)
MONOCYTES ABSOLUTE: 1.07 E9/L (ref 0.1–0.95)
MONOCYTES RELATIVE PERCENT: 14 % (ref 2–12)
MYCOPLASMA PNEUMONIAE BY PCR: NOT DETECTED
NEUTROPHILS ABSOLUTE: 4.57 E9/L (ref 1.8–7.3)
NEUTROPHILS RELATIVE PERCENT: 60 % (ref 43–80)
PARAINFLUENZA VIRUS 1 BY PCR: NOT DETECTED
PARAINFLUENZA VIRUS 2 BY PCR: NOT DETECTED
PARAINFLUENZA VIRUS 3 BY PCR: NOT DETECTED
PARAINFLUENZA VIRUS 4 BY PCR: NOT DETECTED
PDW BLD-RTO: 15.2 FL (ref 11.5–15)
PLATELET # BLD: 277 E9/L (ref 130–450)
PMV BLD AUTO: 9.9 FL (ref 7–12)
POTASSIUM REFLEX MAGNESIUM: 6.4 MMOL/L (ref 3.5–5)
RBC # BLD: 3.04 E12/L (ref 3.8–5.8)
RESPIRATORY SYNCYTIAL VIRUS BY PCR: NOT DETECTED
SARS-COV-2, PCR: DETECTED
SODIUM BLD-SCNC: 135 MMOL/L (ref 132–146)
TOTAL PROTEIN: 7.7 G/DL (ref 6.4–8.3)
TROPONIN: 0.06 NG/ML (ref 0–0.03)
WBC # BLD: 7.6 E9/L (ref 4.5–11.5)

## 2020-12-02 PROCEDURE — 74176 CT ABD & PELVIS W/O CONTRAST: CPT

## 2020-12-02 PROCEDURE — 96375 TX/PRO/DX INJ NEW DRUG ADDON: CPT

## 2020-12-02 PROCEDURE — 96361 HYDRATE IV INFUSION ADD-ON: CPT

## 2020-12-02 PROCEDURE — 86901 BLOOD TYPING SEROLOGIC RH(D): CPT

## 2020-12-02 PROCEDURE — 71250 CT THORAX DX C-: CPT

## 2020-12-02 PROCEDURE — 99285 EMERGENCY DEPT VISIT HI MDM: CPT

## 2020-12-02 PROCEDURE — 86900 BLOOD TYPING SEROLOGIC ABO: CPT

## 2020-12-02 PROCEDURE — 93005 ELECTROCARDIOGRAM TRACING: CPT | Performed by: STUDENT IN AN ORGANIZED HEALTH CARE EDUCATION/TRAINING PROGRAM

## 2020-12-02 PROCEDURE — 6370000000 HC RX 637 (ALT 250 FOR IP): Performed by: EMERGENCY MEDICINE

## 2020-12-02 PROCEDURE — 0202U NFCT DS 22 TRGT SARS-COV-2: CPT

## 2020-12-02 PROCEDURE — 94664 DEMO&/EVAL PT USE INHALER: CPT

## 2020-12-02 PROCEDURE — 96374 THER/PROPH/DIAG INJ IV PUSH: CPT

## 2020-12-02 PROCEDURE — 6370000000 HC RX 637 (ALT 250 FOR IP): Performed by: STUDENT IN AN ORGANIZED HEALTH CARE EDUCATION/TRAINING PROGRAM

## 2020-12-02 PROCEDURE — 93010 ELECTROCARDIOGRAM REPORT: CPT | Performed by: INTERNAL MEDICINE

## 2020-12-02 PROCEDURE — 86850 RBC ANTIBODY SCREEN: CPT

## 2020-12-02 PROCEDURE — 2060000000 HC ICU INTERMEDIATE R&B

## 2020-12-02 PROCEDURE — 84484 ASSAY OF TROPONIN QUANT: CPT

## 2020-12-02 PROCEDURE — 2580000003 HC RX 258: Performed by: STUDENT IN AN ORGANIZED HEALTH CARE EDUCATION/TRAINING PROGRAM

## 2020-12-02 PROCEDURE — 83605 ASSAY OF LACTIC ACID: CPT

## 2020-12-02 PROCEDURE — 2580000003 HC RX 258

## 2020-12-02 PROCEDURE — 93005 ELECTROCARDIOGRAM TRACING: CPT | Performed by: EMERGENCY MEDICINE

## 2020-12-02 PROCEDURE — 85025 COMPLETE CBC W/AUTO DIFF WBC: CPT

## 2020-12-02 PROCEDURE — 80053 COMPREHEN METABOLIC PANEL: CPT

## 2020-12-02 PROCEDURE — 6360000002 HC RX W HCPCS: Performed by: STUDENT IN AN ORGANIZED HEALTH CARE EDUCATION/TRAINING PROGRAM

## 2020-12-02 PROCEDURE — 82962 GLUCOSE BLOOD TEST: CPT

## 2020-12-02 PROCEDURE — 51702 INSERT TEMP BLADDER CATH: CPT

## 2020-12-02 PROCEDURE — 2500000003 HC RX 250 WO HCPCS: Performed by: STUDENT IN AN ORGANIZED HEALTH CARE EDUCATION/TRAINING PROGRAM

## 2020-12-02 PROCEDURE — 2580000003 HC RX 258: Performed by: EMERGENCY MEDICINE

## 2020-12-02 RX ORDER — MAGNESIUM HYDROXIDE/ALUMINUM HYDROXICE/SIMETHICONE 120; 1200; 1200 MG/30ML; MG/30ML; MG/30ML
30 SUSPENSION ORAL DAILY PRN
Status: DISCONTINUED | OUTPATIENT
Start: 2020-12-02 | End: 2020-12-10 | Stop reason: HOSPADM

## 2020-12-02 RX ORDER — MAGNESIUM HYDROXIDE/ALUMINUM HYDROXICE/SIMETHICONE 120; 1200; 1200 MG/30ML; MG/30ML; MG/30ML
30 SUSPENSION ORAL PRN
COMMUNITY

## 2020-12-02 RX ORDER — ASCORBIC ACID 500 MG
500 TABLET ORAL 2 TIMES DAILY
Status: DISCONTINUED | OUTPATIENT
Start: 2020-12-03 | End: 2020-12-10 | Stop reason: HOSPADM

## 2020-12-02 RX ORDER — CALCIUM GLUCONATE 94 MG/ML
1 INJECTION, SOLUTION INTRAVENOUS ONCE
Status: COMPLETED | OUTPATIENT
Start: 2020-12-02 | End: 2020-12-02

## 2020-12-02 RX ORDER — ATENOLOL 50 MG/1
50 TABLET ORAL DAILY
Status: DISCONTINUED | OUTPATIENT
Start: 2020-12-03 | End: 2020-12-10 | Stop reason: HOSPADM

## 2020-12-02 RX ORDER — GEMFIBROZIL 600 MG/1
600 TABLET, FILM COATED ORAL 2 TIMES DAILY
COMMUNITY

## 2020-12-02 RX ORDER — SODIUM CHLORIDE 9 MG/ML
INJECTION, SOLUTION INTRAVENOUS CONTINUOUS
Status: DISCONTINUED | OUTPATIENT
Start: 2020-12-02 | End: 2020-12-03 | Stop reason: SDUPTHER

## 2020-12-02 RX ORDER — NITROGLYCERIN 0.4 MG/1
0.4 TABLET SUBLINGUAL EVERY 5 MIN PRN
COMMUNITY

## 2020-12-02 RX ORDER — CLOPIDOGREL BISULFATE 75 MG/1
75 TABLET ORAL DAILY
Status: DISCONTINUED | OUTPATIENT
Start: 2020-12-03 | End: 2020-12-10 | Stop reason: HOSPADM

## 2020-12-02 RX ORDER — DEXTROSE MONOHYDRATE 25 G/50ML
50 INJECTION, SOLUTION INTRAVENOUS ONCE
Status: COMPLETED | OUTPATIENT
Start: 2020-12-02 | End: 2020-12-02

## 2020-12-02 RX ORDER — ASCORBIC ACID 500 MG
500 TABLET ORAL 2 TIMES DAILY
COMMUNITY

## 2020-12-02 RX ORDER — PANTOPRAZOLE SODIUM 40 MG/1
40 TABLET, DELAYED RELEASE ORAL DAILY
COMMUNITY

## 2020-12-02 RX ORDER — ERGOCALCIFEROL 1.25 MG/1
50000 CAPSULE ORAL WEEKLY
Status: DISCONTINUED | OUTPATIENT
Start: 2020-12-06 | End: 2020-12-10 | Stop reason: HOSPADM

## 2020-12-02 RX ORDER — FERROUS SULFATE 325(65) MG
325 TABLET ORAL 2 TIMES DAILY WITH MEALS
Status: DISCONTINUED | OUTPATIENT
Start: 2020-12-03 | End: 2020-12-10 | Stop reason: HOSPADM

## 2020-12-02 RX ORDER — ATORVASTATIN CALCIUM 20 MG/1
20 TABLET, FILM COATED ORAL DAILY
Status: DISCONTINUED | OUTPATIENT
Start: 2020-12-03 | End: 2020-12-10 | Stop reason: HOSPADM

## 2020-12-02 RX ORDER — NICOTINE POLACRILEX 4 MG
15 LOZENGE BUCCAL PRN
Status: DISCONTINUED | OUTPATIENT
Start: 2020-12-02 | End: 2020-12-10 | Stop reason: HOSPADM

## 2020-12-02 RX ORDER — 0.9 % SODIUM CHLORIDE 0.9 %
1000 INTRAVENOUS SOLUTION INTRAVENOUS ONCE
Status: COMPLETED | OUTPATIENT
Start: 2020-12-02 | End: 2020-12-02

## 2020-12-02 RX ORDER — NITROGLYCERIN 0.4 MG/1
0.4 TABLET SUBLINGUAL EVERY 5 MIN PRN
Status: DISCONTINUED | OUTPATIENT
Start: 2020-12-02 | End: 2020-12-10 | Stop reason: HOSPADM

## 2020-12-02 RX ORDER — DEXTROSE MONOHYDRATE 25 G/50ML
50 INJECTION, SOLUTION INTRAVENOUS PRN
Status: DISCONTINUED | OUTPATIENT
Start: 2020-12-02 | End: 2020-12-02

## 2020-12-02 RX ORDER — DEXTROSE MONOHYDRATE 25 G/50ML
25 INJECTION, SOLUTION INTRAVENOUS ONCE
Status: DISCONTINUED | OUTPATIENT
Start: 2020-12-02 | End: 2020-12-02

## 2020-12-02 RX ORDER — DEXTROSE MONOHYDRATE 25 G/50ML
12.5 INJECTION, SOLUTION INTRAVENOUS PRN
Status: DISCONTINUED | OUTPATIENT
Start: 2020-12-02 | End: 2020-12-10 | Stop reason: HOSPADM

## 2020-12-02 RX ORDER — GLIPIZIDE 5 MG/1
5 TABLET ORAL
Status: DISCONTINUED | OUTPATIENT
Start: 2020-12-03 | End: 2020-12-10 | Stop reason: HOSPADM

## 2020-12-02 RX ORDER — ACETAMINOPHEN 325 MG/1
650 TABLET ORAL EVERY 6 HOURS PRN
Status: DISCONTINUED | OUTPATIENT
Start: 2020-12-02 | End: 2020-12-10 | Stop reason: HOSPADM

## 2020-12-02 RX ORDER — ERGOCALCIFEROL 1.25 MG/1
50000 CAPSULE ORAL WEEKLY
COMMUNITY

## 2020-12-02 RX ORDER — GEMFIBROZIL 600 MG/1
600 TABLET, FILM COATED ORAL
Status: DISCONTINUED | OUTPATIENT
Start: 2020-12-03 | End: 2020-12-10 | Stop reason: HOSPADM

## 2020-12-02 RX ORDER — IPRATROPIUM BROMIDE AND ALBUTEROL SULFATE 2.5; .5 MG/3ML; MG/3ML
1 SOLUTION RESPIRATORY (INHALATION) ONCE
Status: COMPLETED | OUTPATIENT
Start: 2020-12-02 | End: 2020-12-02

## 2020-12-02 RX ORDER — DEXTROSE MONOHYDRATE 50 MG/ML
100 INJECTION, SOLUTION INTRAVENOUS PRN
Status: DISCONTINUED | OUTPATIENT
Start: 2020-12-02 | End: 2020-12-10 | Stop reason: HOSPADM

## 2020-12-02 RX ORDER — SODIUM CHLORIDE 9 MG/ML
INJECTION, SOLUTION INTRAVENOUS CONTINUOUS
Status: DISCONTINUED | OUTPATIENT
Start: 2020-12-03 | End: 2020-12-06

## 2020-12-02 RX ORDER — PANTOPRAZOLE SODIUM 40 MG/1
40 TABLET, DELAYED RELEASE ORAL
Status: DISCONTINUED | OUTPATIENT
Start: 2020-12-03 | End: 2020-12-05

## 2020-12-02 RX ORDER — DEXTROSE MONOHYDRATE 25 G/50ML
INJECTION, SOLUTION INTRAVENOUS
Status: COMPLETED
Start: 2020-12-02 | End: 2020-12-02

## 2020-12-02 RX ORDER — FERROUS SULFATE 325(65) MG
325 TABLET ORAL 2 TIMES DAILY
COMMUNITY

## 2020-12-02 RX ADMIN — Medication 15 G: at 19:24

## 2020-12-02 RX ADMIN — DEXTROSE MONOHYDRATE 50 ML: 25 INJECTION, SOLUTION INTRAVENOUS at 13:51

## 2020-12-02 RX ADMIN — SODIUM BICARBONATE 50 MEQ: 84 INJECTION, SOLUTION INTRAVENOUS at 13:56

## 2020-12-02 RX ADMIN — IPRATROPIUM BROMIDE AND ALBUTEROL SULFATE 1 AMPULE: .5; 2.5 SOLUTION RESPIRATORY (INHALATION) at 20:47

## 2020-12-02 RX ADMIN — SODIUM CHLORIDE 1000 ML: 9 INJECTION, SOLUTION INTRAVENOUS at 14:14

## 2020-12-02 RX ADMIN — SODIUM CHLORIDE 1000 ML: 9 INJECTION, SOLUTION INTRAVENOUS at 13:55

## 2020-12-02 RX ADMIN — CALCIUM GLUCONATE 1 G: 98 INJECTION, SOLUTION INTRAVENOUS at 13:56

## 2020-12-02 RX ADMIN — SODIUM CHLORIDE: 9 INJECTION, SOLUTION INTRAVENOUS at 14:13

## 2020-12-02 NOTE — ED PROVIDER NOTES
The patient is an 51-year-old male with a history of hypertension, type 2 diabetes, stroke in the past, anemia who presents to the emergency department via EMS from [address] [place] [address] complaining of GI bleed over the past 3 days that has become progressively worse. The facility also reports the patient tested positive for Covid recently. He has been experiencing increased shortness of breath and weakness. There are no anticoagulation medications listed on the patient's paperwork. The patient has a history of dementia and unable to obtain accurate history or review of systems from the patient as there is no caregiver present at the bedside. The history is provided by the patient. The history is limited by the absence of a caregiver (dementia). Review of Systems   Unable to perform ROS: Dementia        Physical Exam  Vitals signs and nursing note reviewed. Constitutional:       General: He is not in acute distress. Appearance: He is well-developed. He is ill-appearing (Chronically ill-appearing but no acute distress). He is not toxic-appearing or diaphoretic. Interventions: Nasal cannula in place. HENT:      Head: Normocephalic and atraumatic. Eyes:      General: Lids are normal.   Neck:      Musculoskeletal: Normal range of motion and neck supple. Cardiovascular:      Rate and Rhythm: Normal rate and regular rhythm. Heart sounds: Normal heart sounds, S1 normal and S2 normal. No murmur. Pulmonary:      Effort: Pulmonary effort is normal. No respiratory distress. Breath sounds: Normal breath sounds and air entry. No decreased breath sounds, wheezing, rhonchi or rales. Abdominal:      General: Bowel sounds are normal.      Palpations: Abdomen is soft. Tenderness: There is no abdominal tenderness. There is no guarding or rebound. Skin:     General: Skin is warm and dry. Neurological:      Mental Status: He is alert and oriented to person, place, and time. Motor: No tremor or abnormal muscle tone. Coordination: Coordination normal.          Procedures     MDM  Number of Diagnoses or Management Options  JOHAN (acute kidney injury) ([facility] [address]): Anemia, unspecified type:   Hyperkalemia:   Metastatic malignant neoplasm, unspecified site [facility]):   Diagnosis management comments: The patient is an 51-year-old male who presents to the emergency department from his nursing facility for GI bleeding and increased weakness. The patient is Covid positive. He is hemodynamically stable, he is saturating at 90% on 2 L nasal cannula oxygen. Patient was found to be anemic with a hemoglobin of 9.5 but rectal exam did not show any evidence of acute bleeding. There is no leukocytosis. The patient was hyperkalemic with potassium of 6.4, hyperkalemia protocol was initiated. Patient was also dehydrated with JOHAN and a creatinine of 3.1 and BUN of 49. Patient was hypoglycemic with a blood sugar of 35, he was given an amp of D50. This was rechecked and had improved. He was also provided a meal.  There is no lactic acidosis. Troponin elevated 0.06 with most likely secondary to the acute kidney injury. Covid positive. CT evident of metastatic cancer. Consulted with family doctor who accepted patient for admission. Discussed results and plan of care with patient. Patient verbalized understanding agreement to treatment plan admission. ED Course as of Dec 03 0018   Wed Dec 02, 2020   1651 Consulted with Dr. Donny Cui, hospitalist, who accepted the patient for admission. [KG]      ED Course User Index  [KG] Leta Murcia DO          EKG: This EKG is signed and interpreted by me.     Rate: 66  Rhythm: Sinus  Interpretation: Normal sinus rhythm, left axis deviation, nonspecific ST changes in the anterior leads, no evidence of acute ST elevation or depressions, artifact present at baseline, intervals within normal limits,   Comparison: changes compared to previous EKG 3/18/20    ED Course as of Dec 03 0018   Wed Dec 02, 2020   5465 Consulted with Dr. Raymundo Singh, hospitalist, who accepted the patient for admission. [KG]      ED Course User Index  [KG] Natasha Holley DO       --------------------------------------------- PAST HISTORY ---------------------------------------------  Past Medical History:  has a past medical history of Chronic back pain, Hyperlipidemia, Hypertension, Osteoarthritis, and Type II or unspecified type diabetes mellitus without mention of complication, not stated as uncontrolled. Past Surgical History:  has a past surgical history that includes Spine surgery (2002); Carotid endarterectomy (2008); Endoscopy, colon, diagnostic (02/05/2017); Colonoscopy; Upper gastrointestinal endoscopy (N/A, 4/14/2018); Colonoscopy (N/A, 4/16/2018); and Colonoscopy (4/16/2018). Social History:  reports that he has been smoking cigarettes. He has been smoking about 2.00 packs per day. He has never used smokeless tobacco. He reports that he does not drink alcohol or use drugs. Family History: family history is not on file. The patients home medications have been reviewed.     Allergies: Sulfa antibiotics    -------------------------------------------------- RESULTS -------------------------------------------------    LABS:  Results for orders placed or performed during the hospital encounter of 12/02/20   Respiratory Panel, Molecular, with COVID-19 (Restricted: peds pts or suitable admitted adults)    Specimen: Nasopharyngeal   Result Value Ref Range    Adenovirus by PCR Not Detected Not Detected    Bordetella parapertussis by PCR Not Detected Not Detected    Bordetella pertussis by PCR Not Detected Not Detected    Chlamydophilia pneumoniae by PCR Not Detected Not Detected    Coronavirus 229E by PCR Not Detected Not Detected    Coronavirus HKU1 by PCR Not Detected Not Detected    Coronavirus NL63 by PCR Not Detected Not Detected    Coronavirus OC43 by PCR Not Detected Not Detected    SARS-CoV-2, PCR DETECTED (A) Not Detected    Human Metapneumovirus by PCR Not Detected Not Detected    Human Rhinovirus/Enterovirus by PCR Not Detected Not Detected    Influenza A by PCR Not Detected Not Detected    Influenza B by PCR Not Detected Not Detected    Mycoplasma pneumoniae by PCR Not Detected Not Detected    Parainfluenza Virus 1 by PCR Not Detected Not Detected    Parainfluenza Virus 2 by PCR Not Detected Not Detected    Parainfluenza Virus 3 by PCR Not Detected Not Detected    Parainfluenza Virus 4 by PCR Not Detected Not Detected    Respiratory Syncytial Virus by PCR Not Detected Not Detected   CBC auto differential   Result Value Ref Range    WBC 7.6 4.5 - 11.5 E9/L    RBC 3.04 (L) 3.80 - 5.80 E12/L    Hemoglobin 9.5 (L) 12.5 - 16.5 g/dL    Hematocrit 29.1 (L) 37.0 - 54.0 %    MCV 95.7 80.0 - 99.9 fL    MCH 31.3 26.0 - 35.0 pg    MCHC 32.6 32.0 - 34.5 %    RDW 15.2 (H) 11.5 - 15.0 fL    Platelets 740 264 - 937 E9/L    MPV 9.9 7.0 - 12.0 fL    Neutrophils % 60.0 43.0 - 80.0 %    Immature Granulocytes % 2.6 0.0 - 5.0 %    Lymphocytes % 16.5 (L) 20.0 - 42.0 %    Monocytes % 14.0 (H) 2.0 - 12.0 %    Eosinophils % 6.2 (H) 0.0 - 6.0 %    Basophils % 0.7 0.0 - 2.0 %    Neutrophils Absolute 4.57 1.80 - 7.30 E9/L    Immature Granulocytes # 0.20 E9/L    Lymphocytes Absolute 1.26 (L) 1.50 - 4.00 E9/L    Monocytes Absolute 1.07 (H) 0.10 - 0.95 E9/L    Eosinophils Absolute 0.47 0.05 - 0.50 E9/L    Basophils Absolute 0.05 0.00 - 0.20 E9/L   Comprehensive Metabolic Panel w/ Reflex to MG   Result Value Ref Range    Sodium 135 132 - 146 mmol/L    Potassium reflex Magnesium 6.4 (H) 3.5 - 5.0 mmol/L    Chloride 106 98 - 107 mmol/L    CO2 15 (L) 22 - 29 mmol/L    Anion Gap 14 7 - 16 mmol/L    Glucose 35 (LL) 74 - 99 mg/dL    BUN 49 (H) 8 - 23 mg/dL    CREATININE 3.1 (H) 0.7 - 1.2 mg/dL    GFR Non-African American 19 >=60 mL/min/1.73    GFR African American 24     Calcium 9.3 8.6 - 10.2 mg/dL    Total Protein 7.7 6.4 - 8.3 g/dL    Alb 3.9 3.5 - 5.2 g/dL    Total Bilirubin <0.2 0.0 - 1.2 mg/dL    Alkaline Phosphatase 85 40 - 129 U/L    ALT 7 0 - 40 U/L    AST 10 0 - 39 U/L   Lactic Acid, Plasma   Result Value Ref Range    Lactic Acid 1.8 0.5 - 2.2 mmol/L   Troponin   Result Value Ref Range    Troponin 0.06 (H) 0.00 - 0.03 ng/mL   POCT Glucose   Result Value Ref Range    Glucose 90 mg/dL   POCT Glucose   Result Value Ref Range    Meter Glucose 90 74 - 99 mg/dL   POCT Glucose   Result Value Ref Range    Meter Glucose 55 (L) 74 - 99 mg/dL   POCT Glucose   Result Value Ref Range    Meter Glucose 112 (H) 74 - 99 mg/dL   EKG 12 Lead   Result Value Ref Range    Ventricular Rate 66 BPM    Atrial Rate 66 BPM    P-R Interval 170 ms    QRS Duration 70 ms    Q-T Interval 376 ms    QTc Calculation (Bazett) 394 ms    P Axis 37 degrees    R Axis -6 degrees    T Axis 72 degrees   TYPE AND SCREEN   Result Value Ref Range    ABO/Rh O POS     Antibody Screen NEG        RADIOLOGY:  CT ABDOMEN PELVIS WO CONTRAST   Final Result   1. Evidence of metastatic disease in the abdomen and pelvis which has   developed in the interim since 03/18/2020.   2. Large right adrenal mass measuring 4.9 x 3.6 cm, likely metastatic.   3. 1.7 cm left adrenal mass likely metastatic. Multiple left adrenal   adenomas. 4. Solitary enlarged retrocaval lymph node, likely metastatic. 5.  Liquid stool in the colon indicates a diarrheal illness. No bowel wall   thickening or obstruction noted. CT CHEST WO CONTRAST   Final Result   Enlarging pleural based left lower lobe mass and interval development of   extensive mediastinal and bilateral hilar adenopathy, right   suprarenal/adrenal mass. These are most compatible with metastatic process. Peripheral interstitial fibrosis of the lungs. The findings were sent to the Radiology Results [address] at 3:37   pm on 12/2/2020to be communicated to a licensed caregiver. ------------------------- NURSING NOTES AND VITALS REVIEWED ---------------------------  Date / Time Roomed:  12/2/2020 12:25 PM  ED Bed Assignment:  [id number]    The nursing notes within the ED encounter and vital signs as below have been reviewed. Patient Vitals for the past 24 hrs:   BP Temp Temp src Pulse Resp SpO2   12/02/20 2139 (!) 155/63 -- -- 73 22 100 %   12/02/20 2030 (!) 151/67 -- -- 70 18 100 %   12/02/20 1826 (!) 149/64 -- -- 73 18 100 %   12/02/20 1745 (!) 152/109 -- -- 69 24 --   12/02/20 1730 101/82 -- -- 73 28 --   12/02/20 1715 121/79 -- -- 73 30 --   12/02/20 1700 128/82 -- -- 76 23 --   12/02/20 1645 131/70 -- -- 78 28 --   12/02/20 1615 (!) 110/51 -- -- 73 20 100 %   12/02/20 1521 98/74 -- -- 69 16 100 %   12/02/20 1426 (!) 117/97 -- -- 74 20 100 %   12/02/20 1312 116/62 -- -- 62 19 100 %   12/02/20 1229 105/61 96.8 °F (36 °C) Temporal 76 14 92 %       Oxygen Saturation Interpretation: Normal    ------------------------------------------ PROGRESS NOTES ------------------------------------------  Re-evaluation(s): Patients symptoms show no change  Repeat physical examination is not changed    Counseling:  I have spoken with the patient and discussed todays results, in addition to providing specific details for the plan of care and counseling regarding the diagnosis and prognosis. Their questions are answered at this time and they are agreeable with the plan of admission.    --------------------------------- ADDITIONAL PROVIDER NOTES ---------------------------------  Consultations: Spoke with Dr. Yeni Matos. Discussed case. They will admit the patient.   This patient's ED course included: a personal history and physicial examination, re-evaluation prior to disposition, multiple bedside re-evaluations, IV medications, cardiac monitoring, continuous pulse oximetry and complex medical decision making and emergency management    This patient has remained hemodynamically stable during their ED course. Diagnosis:  1. Metastatic malignant neoplasm, unspecified site ([facility] [address]    2. Hyperkalemia    3. JOHAN (acute kidney injury) ([facility] [address]    4. Anemia, unspecified type        Disposition:  Patient's disposition: Admit to telemetry  Patient's condition is stable.            Solomon Schilling DO  Resident  12/03/20 6050

## 2020-12-02 NOTE — ED NOTES
Bed: 23  Expected date: 12/2/20  Expected time:   Means of arrival: [facility]  Comments:  Amy Bass RN  12/02/20 8551

## 2020-12-03 LAB
EKG ATRIAL RATE: 75 BPM
EKG Q-T INTERVAL: 380 MS
EKG QRS DURATION: 78 MS
EKG QTC CALCULATION (BAZETT): 424 MS
EKG R AXIS: 32 DEGREES
EKG T AXIS: 112 DEGREES
EKG VENTRICULAR RATE: 75 BPM

## 2020-12-03 PROCEDURE — 6370000000 HC RX 637 (ALT 250 FOR IP): Performed by: INTERNAL MEDICINE

## 2020-12-03 PROCEDURE — 2700000000 HC OXYGEN THERAPY PER DAY

## 2020-12-03 PROCEDURE — 93010 ELECTROCARDIOGRAM REPORT: CPT | Performed by: INTERNAL MEDICINE

## 2020-12-03 PROCEDURE — 2580000003 HC RX 258: Performed by: INTERNAL MEDICINE

## 2020-12-03 PROCEDURE — 2060000000 HC ICU INTERMEDIATE R&B

## 2020-12-03 RX ADMIN — METFORMIN HYDROCHLORIDE 850 MG: 850 TABLET ORAL at 09:06

## 2020-12-03 RX ADMIN — FERROUS SULFATE TAB 325 MG (65 MG ELEMENTAL FE) 325 MG: 325 (65 FE) TAB at 17:35

## 2020-12-03 RX ADMIN — GEMFIBROZIL 600 MG: 600 TABLET ORAL at 16:00

## 2020-12-03 RX ADMIN — OXYCODONE HYDROCHLORIDE AND ACETAMINOPHEN 500 MG: 500 TABLET ORAL at 21:02

## 2020-12-03 RX ADMIN — ATORVASTATIN CALCIUM 20 MG: 20 TABLET, FILM COATED ORAL at 09:05

## 2020-12-03 RX ADMIN — GLIPIZIDE 5 MG: 5 TABLET ORAL at 06:32

## 2020-12-03 RX ADMIN — ACETAMINOPHEN 650 MG: 325 TABLET ORAL at 17:35

## 2020-12-03 RX ADMIN — SODIUM CHLORIDE: 9 INJECTION, SOLUTION INTRAVENOUS at 00:11

## 2020-12-03 RX ADMIN — GEMFIBROZIL 600 MG: 600 TABLET ORAL at 06:32

## 2020-12-03 RX ADMIN — PANTOPRAZOLE SODIUM 40 MG: 40 TABLET, DELAYED RELEASE ORAL at 06:32

## 2020-12-03 RX ADMIN — OXYCODONE HYDROCHLORIDE AND ACETAMINOPHEN 500 MG: 500 TABLET ORAL at 09:06

## 2020-12-03 RX ADMIN — CLOPIDOGREL 75 MG: 75 TABLET, FILM COATED ORAL at 09:05

## 2020-12-03 RX ADMIN — FERROUS SULFATE TAB 325 MG (65 MG ELEMENTAL FE) 325 MG: 325 (65 FE) TAB at 08:05

## 2020-12-03 RX ADMIN — METFORMIN HYDROCHLORIDE 850 MG: 850 TABLET ORAL at 21:02

## 2020-12-03 RX ADMIN — ATENOLOL 50 MG: 50 TABLET ORAL at 09:05

## 2020-12-03 ASSESSMENT — PAIN SCALES - GENERAL: PAINLEVEL_OUTOF10: 6

## 2020-12-03 NOTE — H&P
Brenton Villela is a [de-identified] y.o. male  male with a history of hypertension, type 2 diabetes, stroke in the past, anemia who presents to the emergency department via EMS from [address] [facility] complaining of GI bleed over the past 3 days that has become progressively worse. The facility also reports the patient tested positive for Covid recently. He has been experiencing increased shortness of breath and weakness. There are no anticoagulation medications listed on the patient's paperwork. The patient has a history of dementia and unable to obtain accurate history or review of systems from the patient as there is no caregiver present at the bedside. Past Medical History:   Diagnosis Date    Chronic back pain     Hyperlipidemia     Hypertension     Osteoarthritis     Type II or unspecified type diabetes mellitus without mention of complication, not stated as uncontrolled        Past Surgical History:   Procedure Laterality Date    CAROTID ENDARTERECTOMY  2008    COLONOSCOPY      COLONOSCOPY N/A 4/16/2018    COLONOSCOPY WITH BIOPSY performed by Yelena Delgado MD at [address] COLONOSCOPY  4/16/2018    COLONOSCOPY CONTROL HEMORRHAGE performed by Yelena Delgado MD at [address] [place] [address], DIAGNOSTIC  02/05/2017    [address]   [address]    by Dr Taisha Matthew 4/14/2018    EGD BIOPSY performed by Candelaria Ramirez DO at [facility]       History reviewed. No pertinent family history. Prior to Admission medications    Medication Sig Start Date End Date Taking?  Authorizing Provider   gemfibrozil (LOPID) 600 MG tablet Take 600 mg by mouth 2 times daily   Yes Historical Provider, MD   metFORMIN (GLUCOPHAGE) 850 MG tablet Take 850 mg by mouth 2 times daily   Yes Historical Provider, MD   pantoprazole (PROTONIX) 40 MG tablet Take 40 mg by mouth daily   Yes Historical Provider, MD   Acetaminophen (APAP) 325 MG TABS Take 650 mg by mouth every 6 hours as needed for Pain or Fever   Yes Historical Provider, MD   camphor-menthol (SARNA) 0.5-0.5 % lotion Apply 1 Bottle topically every 6 hours as needed for Itching To the Chest   Yes Historical Provider, MD   vitamin D (ERGOCALCIFEROL) 1.25 MG (50251 UT) CAPS capsule Take 50,000 Units by mouth once a week Sundays   Yes Historical Provider, MD   ferrous sulfate (IRON 325) 325 (65 Fe) MG tablet Take 325 mg by mouth 2 times daily   Yes Historical Provider, MD   aluminum & magnesium hydroxide-simethicone (MAALOX) 200-200-20 MG/5ML SUSP suspension Take 30 mLs by mouth as needed (Epigastric Pain)   Yes Historical Provider, MD   nitroGLYCERIN (NITROSTAT) 0.4 MG SL tablet Place 0.4 mg under the tongue every 5 minutes as needed for Chest pain up to max of 3 total doses. Yes Historical Provider, MD   vitamin C (ASCORBIC ACID) 500 MG tablet Take 500 mg by mouth 2 times daily   Yes Historical Provider, MD   glipiZIDE (GLUCOTROL) 5 MG tablet Take 1 tablet by mouth every morning (before breakfast) 3/20/20  Yes Elizabeth Porter MD   atorvastatin (LIPITOR) 20 MG tablet Take 1 tablet by mouth daily 3/19/20  Yes Elizabeth Porter MD   clopidogrel (PLAVIX) 75 MG tablet Take 75 mg by mouth daily   Yes Historical Provider, MD   atenolol (TENORMIN) 50 MG tablet Take 50 mg by mouth daily. Yes Historical Provider, MD   lisinopril (PRINIVIL;ZESTRIL) 40 MG tablet Take 40 mg by mouth daily.    Yes Historical Provider, MD        Allergies: Sulfa antibiotics    Social History     Tobacco Use    Smoking status: Current Every Day Smoker     Packs/day: 2.00     Types: Cigarettes    Smokeless tobacco: Never Used   Substance Use Topics    Alcohol use: No        Review of Systems:  Respiratory: negative for cough and hemoptysis  Cardiovascular: negative for chest pain and dyspnea  Gastrointestinal: negative for abdominal pain, diarrhea, nausea and vomiting  Genitourinary:negative for dysuria and hematuria  Derm: negative for rash and skin lesion(s)  Neurological: negative for seizures and tremors  Endocrine: negative for diabetic symptoms including polydipsia and polyuria    Physical Exam:  Vitals:    12/03/20 1500   BP: 110/67   Pulse: 85   Resp: 21   Temp: 98.4 °F (36.9 °C)   SpO2: 99%      Skin:  Warm and dry.   No rash or bruises  HEENT:  PERRLA, EOMI  Neck:  No JVD, No thyromegaly, No carotid bruit  Cardiac:  RRR, No gallop or murmur  Lungs:  CTA, Normal percussion  Abdomen: Normal bowel sounds, no HSM, non-tender  Extremities:  No clubbing, edema or cyanosis  Neurological:  Moves all extremities    Labs:    CBC with Differential:    Lab Results   Component Value Date    WBC 7.6 12/02/2020    RBC 3.04 12/02/2020    HGB 9.5 12/02/2020    HCT 29.1 12/02/2020     12/02/2020    MCV 95.7 12/02/2020    MCH 31.3 12/02/2020    MCHC 32.6 12/02/2020    RDW 15.2 12/02/2020    LYMPHOPCT 16.5 12/02/2020    MONOPCT 14.0 12/02/2020    BASOPCT 0.7 12/02/2020    MONOSABS 1.07 12/02/2020    LYMPHSABS 1.26 12/02/2020    EOSABS 0.47 12/02/2020    BASOSABS 0.05 12/02/2020     CMP:    Lab Results   Component Value Date     12/02/2020    K 6.4 12/02/2020     12/02/2020    CO2 15 12/02/2020    BUN 49 12/02/2020    CREATININE 3.1 12/02/2020    GFRAA 24 12/02/2020    LABGLOM 19 12/02/2020    GLUCOSE 90 12/02/2020    PROT 7.7 12/02/2020    LABALBU 3.9 12/02/2020    CALCIUM 9.3 12/02/2020    BILITOT <0.2 12/02/2020    ALKPHOS 85 12/02/2020    AST 10 12/02/2020    ALT 7 12/02/2020          Assessment and Plan:    Patient Active Problem List   Diagnosis    Acute renal failure     hyperkalemia    Lung mass most likely malignant with metastasis     Occipital stroke ([address] remote     HTN (hypertension)    Type 2 diabetes mellitus (HCC)    Anemia    Previous heavy smoker

## 2020-12-03 NOTE — PROGRESS NOTES
Dr. Carleen Corado answering service called for admission orders.  Telephone with readback orders received    Bessie Gupta  11:53 PM

## 2020-12-03 NOTE — PROGRESS NOTES
Attempted to draw labs and start a new peripheral IV on this patient. No success after many attempts. Message to IV team via [address] [place] [address].

## 2020-12-03 NOTE — PLAN OF CARE
Problem: Airway Clearance - Ineffective  Goal: Achieve or maintain patent airway  Outcome: Met This Shift     Problem: Gas Exchange - Impaired  Goal: Absence of hypoxia  Outcome: Met This Shift  Goal: Promote optimal lung function  Outcome: Met This Shift     Problem: Breathing Pattern - Ineffective  Goal: Ability to achieve and maintain a regular respiratory rate  Outcome: Met This Shift     Problem:  Body Temperature -  Risk of, Imbalanced  Goal: Ability to maintain a body temperature within defined limits  Outcome: Met This Shift  Goal: Will regain or maintain usual level of consciousness  Outcome: Met This Shift  Goal: Complications related to the disease process, condition or treatment will be avoided or minimized  Outcome: Met This Shift     Problem: Isolation Precautions - Risk of Spread of Infection  Goal: Prevent transmission of infection  Outcome: Met This Shift     Problem: Nutrition Deficits  Goal: Optimize nutrtional status  Outcome: Met This Shift     Problem: Risk for Fluid Volume Deficit  Goal: Maintain normal heart rhythm  Outcome: Met This Shift  Goal: Maintain absence of muscle cramping  Outcome: Met This Shift  Goal: Maintain normal serum potassium, sodium, calcium, phosphorus, and pH  Outcome: Met This Shift     Problem: Loneliness or Risk for Loneliness  Goal: Demonstrate positive use of time alone when socialization is not possible  Outcome: Met This Shift     Problem: Fatigue  Goal: Verbalize increase energy and improved vitality  Outcome: Met This Shift     Problem: Patient Education: Go to Patient Education Activity  Goal: Patient/Family Education  Outcome: Met This Shift     Problem: Skin Integrity:  Goal: Will show no infection signs and symptoms  Description: Will show no infection signs and symptoms  Outcome: Met This Shift  Goal: Absence of new skin breakdown  Description: Absence of new skin breakdown  Outcome: Met This Shift     Problem: Falls - Risk of:  Goal: Will remain free from falls  Description: Will remain free from falls  Outcome: Met This Shift  Goal: Absence of physical injury  Description: Absence of physical injury  Outcome: Met This Shift     Problem: Falls - Risk of:  Goal: Will remain free from falls  Description: Will remain free from falls  Outcome: Met This Shift  Goal: Absence of physical injury  Description: Absence of physical injury  Outcome: Met This Shift

## 2020-12-03 NOTE — PROGRESS NOTES
Unsuccessful after two attempts to draw patient's a.m labs, will notify oncoming IRAIDA Razo  4:29 AM

## 2020-12-03 NOTE — ED NOTES
Call out to [facility] answering service for admission orders.       Julio C Richter RN  12/02/20 2352

## 2020-12-04 LAB
ANION GAP SERPL CALCULATED.3IONS-SCNC: 12 MMOL/L (ref 7–16)
BUN BLDV-MCNC: 35 MG/DL (ref 8–23)
CALCIUM SERPL-MCNC: 8.6 MG/DL (ref 8.6–10.2)
CHLORIDE BLD-SCNC: 110 MMOL/L (ref 98–107)
CO2: 16 MMOL/L (ref 22–29)
CREAT SERPL-MCNC: 2.1 MG/DL (ref 0.7–1.2)
GFR AFRICAN AMERICAN: 37
GFR NON-AFRICAN AMERICAN: 31 ML/MIN/1.73
GLUCOSE BLD-MCNC: 31 MG/DL (ref 74–99)
METER GLUCOSE: 43 MG/DL (ref 74–99)
METER GLUCOSE: 49 MG/DL (ref 74–99)
METER GLUCOSE: 60 MG/DL (ref 74–99)
METER GLUCOSE: 97 MG/DL (ref 74–99)
METER GLUCOSE: <40 MG/DL (ref 74–99)
POTASSIUM SERPL-SCNC: 5.8 MMOL/L (ref 3.5–5)
SODIUM BLD-SCNC: 138 MMOL/L (ref 132–146)

## 2020-12-04 PROCEDURE — 82947 ASSAY GLUCOSE BLOOD QUANT: CPT

## 2020-12-04 PROCEDURE — 2060000000 HC ICU INTERMEDIATE R&B

## 2020-12-04 PROCEDURE — 36415 COLL VENOUS BLD VENIPUNCTURE: CPT

## 2020-12-04 PROCEDURE — 99223 1ST HOSP IP/OBS HIGH 75: CPT | Performed by: INTERNAL MEDICINE

## 2020-12-04 PROCEDURE — 6370000000 HC RX 637 (ALT 250 FOR IP): Performed by: STUDENT IN AN ORGANIZED HEALTH CARE EDUCATION/TRAINING PROGRAM

## 2020-12-04 PROCEDURE — 6370000000 HC RX 637 (ALT 250 FOR IP): Performed by: INTERNAL MEDICINE

## 2020-12-04 PROCEDURE — 2500000003 HC RX 250 WO HCPCS: Performed by: STUDENT IN AN ORGANIZED HEALTH CARE EDUCATION/TRAINING PROGRAM

## 2020-12-04 PROCEDURE — 2700000000 HC OXYGEN THERAPY PER DAY

## 2020-12-04 PROCEDURE — 82962 GLUCOSE BLOOD TEST: CPT

## 2020-12-04 PROCEDURE — 80048 BASIC METABOLIC PNL TOTAL CA: CPT

## 2020-12-04 RX ORDER — SODIUM POLYSTYRENE SULFONATE 15 G/60ML
15 SUSPENSION ORAL; RECTAL ONCE
Status: DISCONTINUED | OUTPATIENT
Start: 2020-12-04 | End: 2020-12-10 | Stop reason: HOSPADM

## 2020-12-04 RX ADMIN — Medication 15 G: at 14:46

## 2020-12-04 RX ADMIN — OXYCODONE HYDROCHLORIDE AND ACETAMINOPHEN 500 MG: 500 TABLET ORAL at 10:14

## 2020-12-04 RX ADMIN — METFORMIN HYDROCHLORIDE 850 MG: 850 TABLET ORAL at 10:14

## 2020-12-04 RX ADMIN — GLIPIZIDE 5 MG: 5 TABLET ORAL at 06:31

## 2020-12-04 RX ADMIN — Medication 30 G: at 23:44

## 2020-12-04 RX ADMIN — FERROUS SULFATE TAB 325 MG (65 MG ELEMENTAL FE) 325 MG: 325 (65 FE) TAB at 10:14

## 2020-12-04 RX ADMIN — GLUCAGON HYDROCHLORIDE 1 MG: KIT at 15:09

## 2020-12-04 RX ADMIN — PANTOPRAZOLE SODIUM 40 MG: 40 TABLET, DELAYED RELEASE ORAL at 06:31

## 2020-12-04 RX ADMIN — GEMFIBROZIL 600 MG: 600 TABLET ORAL at 18:44

## 2020-12-04 RX ADMIN — OXYCODONE HYDROCHLORIDE AND ACETAMINOPHEN 500 MG: 500 TABLET ORAL at 23:02

## 2020-12-04 RX ADMIN — ATORVASTATIN CALCIUM 20 MG: 20 TABLET, FILM COATED ORAL at 10:14

## 2020-12-04 RX ADMIN — CLOPIDOGREL 75 MG: 75 TABLET, FILM COATED ORAL at 10:14

## 2020-12-04 RX ADMIN — GEMFIBROZIL 600 MG: 600 TABLET ORAL at 06:31

## 2020-12-04 RX ADMIN — FERROUS SULFATE TAB 325 MG (65 MG ELEMENTAL FE) 325 MG: 325 (65 FE) TAB at 18:45

## 2020-12-04 ASSESSMENT — PAIN SCALES - GENERAL
PAINLEVEL_OUTOF10: 0
PAINLEVEL_OUTOF10: 0

## 2020-12-04 NOTE — CONSULTS
Pulmonary [facility]                             Pulmonary Consult/Progress Note :          Patient: Gita Salguero  MRN: [id number]  : 1940      Date of Admission: .2020 12:25 PM    Consulting Physician:Dr Familia Merida         Reason for Consultation:Lung mass   CC : GI Bleed   HPI:   Gita Salguero is a [de-identified]y.o. year old with more than 79 pack a year smoking history who presented to the hospital with GI bleed that has been going on for the last 3 days he was progressive and get worse, he was also tested positive for Covid as he was complaining of worsening shortness of breath and cough for the last few days as well he was not on any anticoagulation as Per admission    He has a history of dementia and stroke make history very difficult and he seems very poor historian    He had CAT scan of the chest that shows multiple lung mass along with largemediastinal  Adenopathy. [facility] Billing PAST MEDICAL HISTORY:   Past Medical History:   Diagnosis Date    Chronic back pain     Hyperlipidemia     Hypertension     Osteoarthritis     Type II or unspecified type diabetes mellitus without mention of complication, not stated as uncontrolled        PAST SURGICAL HISTORY:   Past Surgical History:   Procedure Laterality Date    CAROTID ENDARTERECTOMY      COLONOSCOPY      COLONOSCOPY N/A 2018    COLONOSCOPY WITH BIOPSY performed by Zabrina Swan MD at [address] COLONOSCOPY  2018    COLONOSCOPY CONTROL HEMORRHAGE performed by Zabrina Swan MD at [facility]  2017    [address]  [address]    by Dr Alfa Rush N/A 2018    EGD BIOPSY performed by Sharon Blanco DO at [address] [facility] [place]:   History reviewed. No pertinent family history.     SOCIAL HISTORY:   Social History     Socioeconomic History    Marital status: Legally      Spouse name: Not on file    Number of children: Not on file    Years of education: Not on file    Highest education level: Not on file   Occupational History    Not on file   Social Needs    Financial resource strain: Not on file    Food insecurity     Worry: Not on file     Inability: Not on file    Transportation needs     Medical: Not on file     Non-medical: Not on file   Tobacco Use    Smoking status: Current Every Day Smoker     Packs/day: 2.00     Types: Cigarettes    Smokeless tobacco: Never Used   Substance and Sexual Activity    Alcohol use: No    Drug use: No    Sexual activity: Not Currently   Lifestyle    Physical activity     Days per week: Not on file     Minutes per session: Not on file    Stress: Not on file   Relationships    Social connections     Talks on phone: Not on file     Gets together: Not on file     Attends [employer] service: Not on file     Active member of club or organization: Not on file     Attends meetings of clubs or organizations: Not on file     Relationship status: Not on file    Intimate partner violence     Fear of current or ex partner: Not on file     Emotionally abused: Not on file     Physically abused: Not on file     Forced sexual activity: Not on file   Other Topics Concern    Not on file   Social History Narrative    Not on file     Social History     Tobacco Use   Smoking Status Current Every Day Smoker    Packs/day: 2.00    Types: Cigarettes   Smokeless Tobacco Never Used     Social History     Substance and Sexual Activity   Alcohol Use No     Social History     Substance and Sexual Activity   Drug Use No         HOME MEDICATIONS:  Prior to Admission medications    Medication Sig Start Date End Date Taking?  Authorizing Provider   gemfibrozil (LOPID) 600 MG tablet Take 600 mg by mouth 2 times daily   Yes Historical Provider, MD   metFORMIN (GLUCOPHAGE) 850 MG tablet Take 850 mg by mouth 2 times daily   Yes Historical Provider, MD   pantoprazole (PROTONIX) 40 MG tablet Take 40 mg by mouth daily   Yes Historical Provider, MD   Acetaminophen (APAP) 325 MG TABS Take 650 mg by mouth every 6 hours as needed for Pain or Fever   Yes Historical Provider, MD   camphor-menthol (SARNA) 0.5-0.5 % lotion Apply 1 Bottle topically every 6 hours as needed for Itching To the Chest   Yes Historical Provider, MD   vitamin D (ERGOCALCIFEROL) 1.25 MG (88178 UT) CAPS capsule Take 50,000 Units by mouth once a week Sundays   Yes Historical Provider, MD   ferrous sulfate (IRON 325) 325 (65 Fe) MG tablet Take 325 mg by mouth 2 times daily   Yes Historical Provider, MD   aluminum & magnesium hydroxide-simethicone (MAALOX) 200-200-20 MG/5ML SUSP suspension Take 30 mLs by mouth as needed (Epigastric Pain)   Yes Historical Provider, MD   nitroGLYCERIN (NITROSTAT) 0.4 MG SL tablet Place 0.4 mg under the tongue every 5 minutes as needed for Chest pain up to max of 3 total doses. Yes Historical Provider, MD   vitamin C (ASCORBIC ACID) 500 MG tablet Take 500 mg by mouth 2 times daily   Yes Historical Provider, MD   glipiZIDE (GLUCOTROL) 5 MG tablet Take 1 tablet by mouth every morning (before breakfast) 3/20/20  Yes Sarahi Luis MD   atorvastatin (LIPITOR) 20 MG tablet Take 1 tablet by mouth daily 3/19/20  Yes Sarahi Luis MD   clopidogrel (PLAVIX) 75 MG tablet Take 75 mg by mouth daily   Yes Historical Provider, MD   atenolol (TENORMIN) 50 MG tablet Take 50 mg by mouth daily. Yes Historical Provider, MD   lisinopril (PRINIVIL;ZESTRIL) 40 MG tablet Take 40 mg by mouth daily.    Yes Historical Provider, MD       CURRENT MEDICATIONS:  Current Facility-Administered Medications: glucose (GLUTOSE) 40 % oral gel 15 g, 15 g, Oral, PRN  dextrose 50 % IV solution, 12.5 g, Intravenous, PRN  glucagon (rDNA) injection 1 mg, 1 mg, Intramuscular, PRN  dextrose 5 % solution, 100 mL/hr, Intravenous, PRN  influenza A&B vaccine (FLUAD QUADRIVALENT) injection 0.5 mL, 0.5 mL, Intramuscular, Prior to discharge  acetaminophen °F (37.1 °C) (Temporal)     TMAX:  BP Readings from Last 3 Encounters:   12/04/20 (!) 102/50   04/07/20 (!) 143/76   03/20/20 (!) 168/72     Pulse Readings from Last 3 Encounters:   12/04/20 78   04/07/20 81   03/20/20 86           INTAKE/OUTPUTS:  I/O last 3 completed shifts:   In: 360 [P.O.:360]  Out: 1000 [Urine:1000]    Intake/Output Summary (Last 24 hours) at 12/4/2020 1129  Last data filed at 12/4/2020 0933  Gross per 24 hour   Intake 360 ml   Output 1000 ml   Net -640 ml       General Appearance: confused   well-nourished, in no acute distress   Eyes: pupils equal, round, and reactive to light, extraocular eye movements intact, conjunctivae normal and sclera anicteric   Neck: neck supple and non tender without mass, no thyromegaly, no thyroid nodules and no cervical adenopathy   Pulmonary/Chest:*SOB *   Cardiovascular: normal rate, regular rhythm, normal S1 and S2, no murmurs, rubs, clicks or gallops, distal pulses intact, no carotid bruits, no murmurs, no gallops, no carotid bruits and no JVD   Abdomen: obese, soft, non-tender, non-distended, normal bowel sounds, no masses or organomegaly   Extremities:no edema   Musculoskeletal: normal range of motion, no joint swelling, deformity or tenderness   Neurologic: reflexes normal and symmetric, no cranial nerve deficit noted    LABS/IMAGING:    CBC:  Lab Results   Component Value Date    WBC 7.6 12/02/2020    HGB 9.5 (L) 12/02/2020    HCT 29.1 (L) 12/02/2020    MCV 95.7 12/02/2020     12/02/2020    LYMPHOPCT 16.5 (L) 12/02/2020    RBC 3.04 (L) 12/02/2020    MCH 31.3 12/02/2020    MCHC 32.6 12/02/2020    RDW 15.2 (H) 12/02/2020    NEUTOPHILPCT 60.0 12/02/2020    MONOPCT 14.0 (H) 12/02/2020    BASOPCT 0.7 12/02/2020    NEUTROABS 4.57 12/02/2020    LYMPHSABS 1.26 (L) 12/02/2020    MONOSABS 1.07 (H) 12/02/2020    EOSABS 0.47 12/02/2020    BASOSABS 0.05 12/02/2020       Recent Labs     12/02/20  1242   WBC 7.6   HGB 9.5*   HCT 29.1*   MCV 95.7    BMP:   Recent Labs     12/02/20  1242      K 6.4*      CO2 15*   BUN 49*   CREATININE 3.1*       MG: No results found for: MG  Ca/Phos:   Lab Results   Component Value Date    CALCIUM 9.3 12/02/2020     Amylase: No results found for: AMYLASE  Lipase:   Lab Results   Component Value Date    LIPASE 23 04/06/2020     LIVER PROFILE:   Recent Labs     12/02/20  1242   AST 10   ALT 7   BILITOT <0.2   ALKPHOS 85       PT/INR: No results for input(s): PROTIME, INR in the last 72 hours. APTT: No results for input(s): APTT in the last 72 hours. Cardiac Enzymes:  Lab Results   Component Value Date    CKTOTAL 92 05/15/2017    TROPONINI 0.06 (H) 12/02/2020                   PROBLEM LIST:  Patient Active Problem List   Diagnosis    Back pain    Spinal stenosis, lumbar region, without neurogenic claudication    Radiculopathy, lumbar region    Lumbar spine instability    [facility]    HTN (hypertension)    Type 2 diabetes mellitus ([address]    Chest pain    Iron deficiency anemia due to chronic blood loss    Anemia    Epigastric pain    JOHAN (acute kidney injury) ([address]               ASSESSMENT:  1.) Lung Masscancer most likely    2. )COPD   3. )COVID Pneumonia  4. )Hypoxia   5.)Gi Bleed      PLAN:  *-GI bleed as Per primary, hemoglobin seems stable defer surgical evaluation scopes to them  *-Patient with Covid pneumonia on top of possible COPD I will commended Decadron to help with COPD and his Covid, watch hemoglobin carefully in case any drop in his hemoglobin then we will stop it and reviewed with the patient in pantoprazole twice daily  *-Etiology for his lung mass, at this time I will let him recover from Covid, so most likely we will do it as outpatient in couple weeks  And we will do bronchoscopy with EBUS for station 7 for diagnosis and for staging or we can biopsy his adrenal mass for CT-guided and that we get also staging and diagnosis    In either case we have to wait 2 weeks until he is Covid free      Thank you very much for allowing me to participate in the care of this pleasant patient , should you have any questions ,please do not hesitate to contact me      Sj Avila MD,[facility]  Pulmonary&Critical Care Medicine   Director of [address] [place] [address] Director of [address] [place] [address]    Jason Fermin    NOTE: This report was transcribed using voice recognition software. Every effort was made to ensure accuracy; however, inadvertent computerized transcription errors may be present.

## 2020-12-04 NOTE — PROGRESS NOTES
Lab called and reported a blood glucose level of 31. When assessed patient was alert with oritation normal for patient. This had the patient drink some apple juice and ate some jello. Patient also drank some gingerale that he requested. turkery sandwich offered but patient refused. When recheck glucose level only up to 49. Glutose gel then given to patient. Recheck shown a blood glucose of 43.  Patient given glucagen injection

## 2020-12-04 NOTE — PLAN OF CARE
Problem: Airway Clearance - Ineffective  Goal: Achieve or maintain patent airway  12/4/2020 0247 by Guzman Villalpando RN  Outcome: Met This Shift  12/3/2020 1837 by Francy Painting RN  Outcome: Met This Shift     Problem: Gas Exchange - Impaired  Goal: Absence of hypoxia  12/4/2020 0247 by Guzman Villalpando RN  Outcome: Met This Shift  12/3/2020 1837 by Francy Painting RN  Outcome: Met This Shift  Goal: Promote optimal lung function  12/4/2020 0247 by Guzman Villalpando RN  Outcome: Met This Shift  12/3/2020 1837 by Francy Painting RN  Outcome: Met This Shift     Problem: Breathing Pattern - Ineffective  Goal: Ability to achieve and maintain a regular respiratory rate  12/4/2020 0247 by Guzman Villalpando RN  Outcome: Met This Shift  12/3/2020 1837 by Francy Painting RN  Outcome: Met This Shift     Problem:  Body Temperature -  Risk of, Imbalanced  Goal: Ability to maintain a body temperature within defined limits  12/4/2020 0247 by Guzman Villalpando RN  Outcome: Met This Shift  12/3/2020 1837 by Francy Painting RN  Outcome: Met This Shift  Goal: Will regain or maintain usual level of consciousness  12/4/2020 0247 by Guzman Villalpando RN  Outcome: Met This Shift  12/3/2020 1837 by Francy Painting RN  Outcome: Met This Shift  Goal: Complications related to the disease process, condition or treatment will be avoided or minimized  12/4/2020 0247 by Guzman Villalpando RN  Outcome: Met This Shift  12/3/2020 1837 by Francy Painting RN  Outcome: Met This Shift     Problem: Isolation Precautions - Risk of Spread of Infection  Goal: Prevent transmission of infection  12/4/2020 0247 by Guzman Villalpando RN  Outcome: Met This Shift  12/3/2020 1837 by Francy Painting RN  Outcome: Met This Shift     Problem: Nutrition Deficits  Goal: Optimize nutrtional status  12/4/2020 0247 by Guzman Villalpando RN  Outcome: Met This Shift  12/3/2020 1837 by Francy Painting RN  Outcome: Met This Shift     Problem: Risk for Fluid Volume Deficit  Goal: Maintain normal heart rhythm  12/4/2020 0247 by Garret Oneill RN  Outcome: Met This Shift  12/3/2020 1837 by Michael Quintero RN  Outcome: Met This Shift  Goal: Maintain absence of muscle cramping  12/4/2020 0247 by Garret Oneill RN  Outcome: Met This Shift  12/3/2020 1837 by Michael Quintero RN  Outcome: Met This Shift  Goal: Maintain normal serum potassium, sodium, calcium, phosphorus, and pH  12/4/2020 0247 by Garret Oneill RN  Outcome: Met This Shift  12/3/2020 1837 by Michael Quintero RN  Outcome: Met This Shift     Problem: Loneliness or Risk for Loneliness  Goal: Demonstrate positive use of time alone when socialization is not possible  12/4/2020 0247 by Garret Oneill RN  Outcome: Met This Shift  12/3/2020 1837 by Michael Quintero RN  Outcome: Met This Shift     Problem: Fatigue  Goal: Verbalize increase energy and improved vitality  12/4/2020 0247 by Garret Oneill RN  Outcome: Met This Shift  12/3/2020 1837 by Michael Quintero RN  Outcome: Met This Shift     Problem: Patient Education: Go to Patient Education Activity  Goal: Patient/Family Education  12/4/2020 0247 by Garret Oneill RN  Outcome: Met This Shift  12/3/2020 1837 by Michael Quintero RN  Outcome: Met This Shift     Problem: Skin Integrity:  Goal: Will show no infection signs and symptoms  Description: Will show no infection signs and symptoms  12/4/2020 0247 by Garret Oneill RN  Outcome: Met This Shift  12/3/2020 1837 by Michael Quintero RN  Outcome: Met This Shift  Goal: Absence of new skin breakdown  Description: Absence of new skin breakdown  12/4/2020 0247 by Garret Oneill RN  Outcome: Met This Shift  12/3/2020 1837 by Michael Quintero RN  Outcome: Met This Shift     Problem: Falls - Risk of:  Goal: Will remain free from falls  Description: Will remain free from falls  12/4/2020 0247 by Garret Onelil RN  Outcome: Met This Shift  12/3/2020 1837 by Michael Quintero RN  Outcome: Met This Shift  Goal: Absence of physical

## 2020-12-05 PROBLEM — J44.9 COPD (CHRONIC OBSTRUCTIVE PULMONARY DISEASE) (HCC): Status: ACTIVE | Noted: 2020-12-05

## 2020-12-05 PROBLEM — U07.1 COVID-19 VIRUS INFECTION: Status: ACTIVE | Noted: 2020-12-05

## 2020-12-05 PROBLEM — K92.2 GI BLEED: Status: ACTIVE | Noted: 2020-12-05

## 2020-12-05 LAB
ANION GAP SERPL CALCULATED.3IONS-SCNC: 13 MMOL/L (ref 7–16)
BUN BLDV-MCNC: 31 MG/DL (ref 8–23)
CALCIUM SERPL-MCNC: 8.3 MG/DL (ref 8.6–10.2)
CHLORIDE BLD-SCNC: 101 MMOL/L (ref 98–107)
CO2: 14 MMOL/L (ref 22–29)
CREAT SERPL-MCNC: 1.9 MG/DL (ref 0.7–1.2)
GFR AFRICAN AMERICAN: 41
GFR NON-AFRICAN AMERICAN: 34 ML/MIN/1.73
GLUCOSE BLD-MCNC: 200 MG/DL (ref 74–99)
GLUCOSE BLD-MCNC: 39 MG/DL (ref 74–99)
HCT VFR BLD CALC: 26.4 % (ref 37–54)
HCT VFR BLD CALC: 26.6 % (ref 37–54)
HEMOGLOBIN: 8.1 G/DL (ref 12.5–16.5)
IMMATURE RETIC FRACT: 25.6 % (ref 2.3–13.4)
IRON SATURATION: 20 % (ref 20–55)
IRON: 40 MCG/DL (ref 59–158)
LACTATE DEHYDROGENASE: 238 U/L (ref 135–225)
MCH RBC QN AUTO: 30.5 PG (ref 26–35)
MCHC RBC AUTO-ENTMCNC: 30.7 % (ref 32–34.5)
MCV RBC AUTO: 99.2 FL (ref 80–99.9)
METER GLUCOSE: 102 MG/DL (ref 74–99)
METER GLUCOSE: 156 MG/DL (ref 74–99)
METER GLUCOSE: 207 MG/DL (ref 74–99)
METER GLUCOSE: 225 MG/DL (ref 74–99)
METER GLUCOSE: 242 MG/DL (ref 74–99)
METER GLUCOSE: 41 MG/DL (ref 74–99)
METER GLUCOSE: 46 MG/DL (ref 74–99)
PDW BLD-RTO: 15.6 FL (ref 11.5–15)
PLATELET # BLD: 256 E9/L (ref 130–450)
PMV BLD AUTO: 9.6 FL (ref 7–12)
POTASSIUM SERPL-SCNC: 5.2 MMOL/L (ref 3.5–5)
RBC # BLD: 2.66 E12/L (ref 3.8–5.8)
RETIC HGB EQUIVALENT: 32.2 PG (ref 28.2–36.6)
RETICULOCYTE ABSOLUTE COUNT: 0.07 E12/L
RETICULOCYTE COUNT PCT: 2.8 % (ref 0.4–1.9)
SODIUM BLD-SCNC: 128 MMOL/L (ref 132–146)
TOTAL IRON BINDING CAPACITY: 198 MCG/DL (ref 250–450)
WBC # BLD: 5.8 E9/L (ref 4.5–11.5)

## 2020-12-05 PROCEDURE — 82728 ASSAY OF FERRITIN: CPT

## 2020-12-05 PROCEDURE — 83540 ASSAY OF IRON: CPT

## 2020-12-05 PROCEDURE — 2580000003 HC RX 258: Performed by: INTERNAL MEDICINE

## 2020-12-05 PROCEDURE — 6370000000 HC RX 637 (ALT 250 FOR IP): Performed by: INTERNAL MEDICINE

## 2020-12-05 PROCEDURE — 2700000000 HC OXYGEN THERAPY PER DAY

## 2020-12-05 PROCEDURE — 36415 COLL VENOUS BLD VENIPUNCTURE: CPT

## 2020-12-05 PROCEDURE — 2060000000 HC ICU INTERMEDIATE R&B

## 2020-12-05 PROCEDURE — 99233 SBSQ HOSP IP/OBS HIGH 50: CPT | Performed by: INTERNAL MEDICINE

## 2020-12-05 PROCEDURE — 6360000002 HC RX W HCPCS: Performed by: INTERNAL MEDICINE

## 2020-12-05 PROCEDURE — 82962 GLUCOSE BLOOD TEST: CPT

## 2020-12-05 PROCEDURE — 83615 LACTATE (LD) (LDH) ENZYME: CPT

## 2020-12-05 PROCEDURE — 85045 AUTOMATED RETICULOCYTE COUNT: CPT

## 2020-12-05 PROCEDURE — 83550 IRON BINDING TEST: CPT

## 2020-12-05 PROCEDURE — 80048 BASIC METABOLIC PNL TOTAL CA: CPT

## 2020-12-05 PROCEDURE — 85027 COMPLETE CBC AUTOMATED: CPT

## 2020-12-05 PROCEDURE — 2580000003 HC RX 258: Performed by: STUDENT IN AN ORGANIZED HEALTH CARE EDUCATION/TRAINING PROGRAM

## 2020-12-05 PROCEDURE — 2500000003 HC RX 250 WO HCPCS: Performed by: STUDENT IN AN ORGANIZED HEALTH CARE EDUCATION/TRAINING PROGRAM

## 2020-12-05 RX ORDER — PANTOPRAZOLE SODIUM 40 MG/1
40 TABLET, DELAYED RELEASE ORAL
Status: DISCONTINUED | OUTPATIENT
Start: 2020-12-05 | End: 2020-12-10 | Stop reason: HOSPADM

## 2020-12-05 RX ORDER — DEXAMETHASONE SODIUM PHOSPHATE 4 MG/ML
2 INJECTION, SOLUTION INTRA-ARTICULAR; INTRALESIONAL; INTRAMUSCULAR; INTRAVENOUS; SOFT TISSUE EVERY 8 HOURS
Status: DISCONTINUED | OUTPATIENT
Start: 2020-12-05 | End: 2020-12-07

## 2020-12-05 RX ADMIN — OXYCODONE HYDROCHLORIDE AND ACETAMINOPHEN 500 MG: 500 TABLET ORAL at 20:17

## 2020-12-05 RX ADMIN — SODIUM CHLORIDE: 9 INJECTION, SOLUTION INTRAVENOUS at 10:47

## 2020-12-05 RX ADMIN — DEXAMETHASONE SODIUM PHOSPHATE 2 MG: 4 INJECTION, SOLUTION INTRA-ARTICULAR; INTRALESIONAL; INTRAMUSCULAR; INTRAVENOUS; SOFT TISSUE at 16:52

## 2020-12-05 RX ADMIN — GLUCAGON HYDROCHLORIDE 1 MG: KIT at 00:55

## 2020-12-05 RX ADMIN — GEMFIBROZIL 600 MG: 600 TABLET ORAL at 16:52

## 2020-12-05 RX ADMIN — DEXAMETHASONE SODIUM PHOSPHATE 2 MG: 4 INJECTION, SOLUTION INTRA-ARTICULAR; INTRALESIONAL; INTRAMUSCULAR; INTRAVENOUS; SOFT TISSUE at 01:41

## 2020-12-05 RX ADMIN — PANTOPRAZOLE SODIUM 40 MG: 40 TABLET, DELAYED RELEASE ORAL at 16:52

## 2020-12-05 RX ADMIN — DEXTROSE MONOHYDRATE 100 ML/HR: 50 INJECTION, SOLUTION INTRAVENOUS at 00:58

## 2020-12-05 RX ADMIN — FERROUS SULFATE TAB 325 MG (65 MG ELEMENTAL FE) 325 MG: 325 (65 FE) TAB at 16:52

## 2020-12-05 RX ADMIN — OXYCODONE HYDROCHLORIDE AND ACETAMINOPHEN 500 MG: 500 TABLET ORAL at 09:49

## 2020-12-05 RX ADMIN — ATORVASTATIN CALCIUM 20 MG: 20 TABLET, FILM COATED ORAL at 09:49

## 2020-12-05 RX ADMIN — ATENOLOL 50 MG: 50 TABLET ORAL at 09:49

## 2020-12-05 RX ADMIN — METFORMIN HYDROCHLORIDE 850 MG: 850 TABLET ORAL at 20:17

## 2020-12-05 RX ADMIN — FERROUS SULFATE TAB 325 MG (65 MG ELEMENTAL FE) 325 MG: 325 (65 FE) TAB at 09:49

## 2020-12-05 RX ADMIN — PANTOPRAZOLE SODIUM 40 MG: 40 TABLET, DELAYED RELEASE ORAL at 10:47

## 2020-12-05 RX ADMIN — DEXAMETHASONE SODIUM PHOSPHATE 2 MG: 4 INJECTION, SOLUTION INTRA-ARTICULAR; INTRALESIONAL; INTRAMUSCULAR; INTRAVENOUS; SOFT TISSUE at 10:44

## 2020-12-05 ASSESSMENT — PAIN SCALES - GENERAL
PAINLEVEL_OUTOF10: 0

## 2020-12-05 NOTE — CONSULTS
[facility]  Hematology/Oncology  Consult      Patient Name: Doan Avina  YOB: 1940  PCP: Ifeanyi Sanchez MD   Referring Provider:      Reason for Consultation:   Chief Complaint   Patient presents with    GI Bleeding     x 3 days, + COVID        History of Present Illness:  70-year-old man hospitalized with GI bleeding and he also tested positive for COVID-19. He has been complaining of progressively worsening cough along with shortness of breath and he has a 70-pack-year history of smoking. He also has history of stroke and dementia and is a very poor historian. CT scan of the chest showed interval development of progressive extensive mediastinal and hilar lymphadenopathy largest and subcarinal space measuring up to 5.7 cm with involvement of left hilum with lymphadenopathy extending to superior mediastinum with reticulonodular markings and interstitial fibrosis. There is an enlarging 3.2 x 2 cm left lower lobe mass abutting the pleura. In the upper abdomen there is a large 5.4 cm right adrenal mass suspicious for metastasis  Multiple retrocaval nodes also noted suspicious for metastatic disease.       Diagnostic Data:     Past Medical History:   Diagnosis Date    Chronic back pain     Hyperlipidemia     Hypertension     Osteoarthritis     Type II or unspecified type diabetes mellitus without mention of complication, not stated as uncontrolled        Patient Active Problem List    Diagnosis Date Noted    COVID-19 virus infection 12/05/2020    COPD (chronic obstructive pulmonary disease) ([facility] [address] 12/05/2020    GI bleed 12/05/2020    JOHAN (acute kidney injury) ([facility] [address] 12/02/2020    Epigastric pain 03/18/2020    Anemia     Iron deficiency anemia due to chronic blood loss     Chest pain     Occipital stroke ([address] 03/27/2014    HTN (hypertension) 03/27/2014    Type 2 diabetes mellitus ([address] 03/27/2014    Lumbar spine instability 03/21/2014    Back pain 11/09/2012    suspension Take 30 mLs by mouth as needed (Epigastric Pain)   Yes Historical Provider, MD   nitroGLYCERIN (NITROSTAT) 0.4 MG SL tablet Place 0.4 mg under the tongue every 5 minutes as needed for Chest pain up to max of 3 total doses. Yes Historical Provider, MD   vitamin C (ASCORBIC ACID) 500 MG tablet Take 500 mg by mouth 2 times daily   Yes Historical Provider, MD   glipiZIDE (GLUCOTROL) 5 MG tablet Take 1 tablet by mouth every morning (before breakfast) 3/20/20  Yes Paul Carey MD   atorvastatin (LIPITOR) 20 MG tablet Take 1 tablet by mouth daily 3/19/20  Yes Paul Carey MD   clopidogrel (PLAVIX) 75 MG tablet Take 75 mg by mouth daily   Yes Historical Provider, MD   atenolol (TENORMIN) 50 MG tablet Take 50 mg by mouth daily. Yes Historical Provider, MD   lisinopril (PRINIVIL;ZESTRIL) 40 MG tablet Take 40 mg by mouth daily. Yes Historical Provider, MD       Allergies  Allergies   Allergen Reactions    Sulfa Antibiotics        Review of Systems:    Relevant for cough progressively worsening shortness of breath and dark stools. Objective  BP (!) 107/58   Pulse 84   Temp 97.9 °F (36.6 °C) (Temporal)   Resp 20   SpO2 97%     Physical Exam:     General: Alert but confused in no acute distress,   Head and neck : PERRLA, EOMI . Sclera non icteric. Oropharynx : Clear  Neck: no JVD,  no adenopathy,  Heart: Regular rate and regular rhythm,   Lungs: Scattered rhonchi  Extremities: No edema,no cyanosis, no clubbing. Abdomen: Soft, non-tender;no masses, no organomegaly  Neurologic:Cranial nerves grossly intact. No focal motor or sensory deficits .     Recent Laboratory Data-   Lab Results   Component Value Date    WBC 5.8 12/05/2020    HGB 8.1 (L) 12/05/2020    HCT 26.4 (L) 12/05/2020    MCV 99.2 12/05/2020     12/05/2020    LYMPHOPCT 16.5 (L) 12/02/2020    RBC 2.66 (L) 12/05/2020    MCH 30.5 12/05/2020    MCHC 30.7 (L) 12/05/2020    RDW 15.6 (H) 12/05/2020    NEUTOPHILPCT 60.0 12/02/2020 MONOPCT 14.0 (H) 12/02/2020    BASOPCT 0.7 12/02/2020    NEUTROABS 4.57 12/02/2020    LYMPHSABS 1.26 (L) 12/02/2020    MONOSABS 1.07 (H) 12/02/2020    EOSABS 0.47 12/02/2020    BASOSABS 0.05 12/02/2020       Lab Results   Component Value Date     (L) 12/05/2020    K 5.2 (H) 12/05/2020     12/05/2020    CO2 14 (L) 12/05/2020    BUN 31 (H) 12/05/2020    CREATININE 1.9 (H) 12/05/2020    GLUCOSE 200 (H) 12/05/2020    CALCIUM 8.3 (L) 12/05/2020    PROT 7.7 12/02/2020    LABALBU 3.9 12/02/2020    BILITOT <0.2 12/02/2020    ALKPHOS 85 12/02/2020    AST 10 12/02/2020    ALT 7 12/02/2020    LABGLOM 34 12/05/2020    GFRAA 41 12/05/2020       No results found for: IRON, TIBC, FERRITIN        Radiology-    Ct Abdomen Pelvis Wo Contrast    Result Date: 12/2/2020  EXAMINATION: CT OF THE ABDOMEN AND PELVIS WITHOUT CONTRAST 12/2/2020 3:00 pm TECHNIQUE: CT of the abdomen and pelvis was performed without the administration of intravenous contrast. Multiplanar reformatted images are provided for review. Dose modulation, iterative reconstruction, and/or weight based adjustment of the mA/kV was utilized to reduce the radiation dose to as low as reasonably achievable. COMPARISON: CT of the abdomen and pelvis, 03/18/2020 HISTORY: ORDERING SYSTEM PROVIDED HISTORY: pain TECHNOLOGIST PROVIDED HISTORY: Reason for exam:->pain What reading provider will be dictating this exam?->CRC FINDINGS: Organs: Liver: Unremarkable. Gallbladder: Cholelithiasis noted in the region of the gallbladder neck. No pericholecystic edema. Pancreas: Unremarkable. Spleen:  Unremarkable. Adrenals: Compared to the previous CT from 03/18/2020, there is interval development of a large mass in the right adrenal gland consistent with metastatic disease. It measures approximately 4.9 x 3.6 cm. There is probable 1.7 cm metastatic lesion in the body of the left adrenal gland. Additional hypodense foci are stable and likely represent adenomas.  Kidneys: exam?->CRC FINDINGS: Mediastinum: Interval development/progression of extensive mediastinal and bilateral hilar adenopathy. These measure up to 4.8 x 3.1 cm in the subcarinal space, 5.7 x 4.4 cm in the left hilum, and 2.4 by 2.2 cm in the superior mediastinum. Lungs/pleura: Stable reticular markings and interstitial fibrosis predominantly involving the peripheral lung zones noted. No airspace consolidation or pneumothorax. There is an enlarging 3.2 x 2.0 cm left lower lobe mass abutting against the pleural a, previously measured 2.2 x 1.3 cm. Previously noted small left pleural effusion has resolved. Upper Abdomen: Interval development of 5.4 x 3.6 cm right suprarenal mass. The left adrenal gland remains enlarged. There are small calcified gallstones. Multiple cystic changes noted in the visualized portion of the kidneys. Soft Tissues/Bones: No lytic or blastic bony lesions. Degenerative changes in the spine. Enlarging pleural based left lower lobe mass and interval development of extensive mediastinal and bilateral hilar adenopathy, right suprarenal/adrenal mass. These are most compatible with metastatic process. Peripheral interstitial fibrosis of the lungs. The findings were sent to the Radiology Results [address] at 3:37 pm on 12/2/2020to be communicated to a licensed caregiver. ASSESSMENT/PLAN : 80-year-old man    Left lung mass with extensive mediastinal and hilar lymphadenopathy with evidence of intra-abdominal lymphadenopathy and bulky right adrenal metastasis. This is highly consistent with metastatic lung cancer in a heavy smoker. Seen by pulmonology Dr. Darin Foley. COVID-19 pneumonia    Will eventually need tissue diagnosis which would include a CT-guided biopsy of right adrenal mass to be planned after recovery from Covid. He is also followed by pulmonary with possible plans for EBUS.     Hypoxia secondary to Covid pneumonia and COPD  Anemia multifactorial likely related to GI bleed and also contributed by CKD stage III  Continues on Protonix. Check iron studies reticulocyte and LDH and monitor hemoglobin    We will follow          Jimmie Lyme E. Para Lanes, M.D., F.A.C.P.   Electronically signed 12/5/2020 at 10:53 AM

## 2020-12-05 NOTE — PROGRESS NOTES
General Progress Note  12/5/2020 7:55 AM  Subjective:   Admit Date: 12/2/2020  PCP: Lovely Oliver MD  Interval History: covering for Dr. Danya Peabody. Patient looks weak and frail. Noted to having chills this a.m. Appetite is fair. Denies any nausea or vomiting. Diet: DIET CARB CONTROL;  Pain is:None  Nausea:None  Bowel Movement/Flatus yes    Data:   Scheduled Meds:   pantoprazole  40 mg Oral BID AC    dexamethasone  2 mg Intravenous Q8H    sodium polystyrene  15 g Oral Once    influenza virus vaccine  0.5 mL Intramuscular Prior to discharge    atenolol  50 mg Oral Daily    atorvastatin  20 mg Oral Daily    [Held by provider] clopidogrel  75 mg Oral Daily    ferrous sulfate  325 mg Oral BID WC    gemfibrozil  600 mg Oral BID AC    glipiZIDE  5 mg Oral QAM AC    metFORMIN  850 mg Oral BID    vitamin C  500 mg Oral BID    [START ON 12/6/2020] vitamin D  50,000 Units Oral Weekly     Continuous Infusions:   dextrose 100 mL/hr (12/05/20 0058)    sodium chloride Stopped (12/04/20 2200)     PRN Meds:glucose, dextrose, glucagon (rDNA), dextrose, acetaminophen, aluminum & magnesium hydroxide-simethicone, nitroGLYCERIN  I/O last 3 completed shifts: In: 300 [P.O.:300]  Out: 1100 [Urine:1100]  No intake/output data recorded.     Intake/Output Summary (Last 24 hours) at 12/5/2020 0755  Last data filed at 12/5/2020 0500  Gross per 24 hour   Intake 300 ml   Output 1100 ml   Net -800 ml       CBC with Differential:    Lab Results   Component Value Date    WBC 7.6 12/02/2020    RBC 3.04 12/02/2020    HGB 9.5 12/02/2020    HCT 29.1 12/02/2020     12/02/2020    MCV 95.7 12/02/2020    MCH 31.3 12/02/2020    MCHC 32.6 12/02/2020    RDW 15.2 12/02/2020    LYMPHOPCT 16.5 12/02/2020    MONOPCT 14.0 12/02/2020    BASOPCT 0.7 12/02/2020    MONOSABS 1.07 12/02/2020    LYMPHSABS 1.26 12/02/2020    EOSABS 0.47 12/02/2020    BASOSABS 0.05 12/02/2020     CMP:    Lab Results   Component Value Date     12/04/2020    K 5.8 12/04/2020    K 6.4 12/02/2020     12/04/2020    CO2 16 12/04/2020    BUN 35 12/04/2020    CREATININE 2.1 12/04/2020    GFRAA 37 12/04/2020    LABGLOM 31 12/04/2020    GLUCOSE 39 12/04/2020    PROT 7.7 12/02/2020    LABALBU 3.9 12/02/2020    CALCIUM 8.6 12/04/2020    BILITOT <0.2 12/02/2020    ALKPHOS 85 12/02/2020    AST 10 12/02/2020    ALT 7 12/02/2020     Magnesium:  No results found for: MG  Phosphorus:  No results found for: PHOS  PT/INR:    Lab Results   Component Value Date    PROTIME 13.1 04/06/2020    INR 1.2 04/06/2020     Last 3 Troponin:    Lab Results   Component Value Date    TROPONINI 0.06 12/02/2020    TROPONINI 0.22 04/06/2020    TROPONINI 0.06 03/17/2020     U/A:    Lab Results   Component Value Date    COLORU Yellow 04/06/2020    PHUR 5.5 04/06/2020    WBCUA NONE 04/06/2020    RBCUA 0-1 04/06/2020    BACTERIA RARE 04/06/2020    CLARITYU Clear 04/06/2020    SPECGRAV 1.025 04/06/2020    LEUKOCYTESUR Negative 04/06/2020    UROBILINOGEN 0.2 04/06/2020    BILIRUBINUR Negative 04/06/2020    BLOODU Negative 04/06/2020    GLUCOSEU Negative 04/06/2020     HgBA1c:  No components found for: HGBA1C  TSH:  No results found for: TSH  -----------------------------------------------------------------    Objective:   Vitals: /61   Pulse 118   Temp 97.2 °F (36.2 °C) (Temporal)   Resp 29   SpO2 100%   General appearance: alert, appears stated age and cooperative  Skin: Skin color, texture, turgor normal. No rashes or lesions  HEENT: Head: Normal, normocephalic, atraumatic.   Neck: no adenopathy, no carotid bruit, no JVD, supple, symmetrical, trachea midline and thyroid not enlarged, symmetric, no tenderness/mass/nodules  Lungs: diminished breath sounds bibasilar  Heart: regular rate and rhythm, S1, S2 normal, no murmur, click, rub or gallop  Abdomen: soft, non-tender; bowel sounds normal; no masses,  no organomegaly  Extremities: extremities normal, atraumatic, no cyanosis or edema  Neurologic: Mental status: Alert, oriented, thought content appropriate    Assessment:   Principal Problem:    JOHAN (acute kidney injury) ([facility] [address]  Active Problems:    Spinal stenosis, lumbar region, without neurogenic claudication    HTN (hypertension)    Type 2 diabetes mellitus ([facility] [address]    Anemia    COVID-19 virus infection    COPD (chronic obstructive pulmonary disease) ([facility] [address]    GI bleed  Resolved Problems:    * No resolved hospital problems. *    Plan:   1. Continue present fluids. 2. Monitor labs and treat accordingly. 3. Prognosis is poor. 4. Will follow.     Claude Jeffries, D.OAlexa  7:55 AM  12/5/2020

## 2020-12-05 NOTE — PROGRESS NOTES
Glucagon administered per hypoglycemic; blood glucose 46 (POCT) (39 lab). New IV site established. D5W started at 100 ml per hypoglycemic protocol. Patient status unchanged. Remains cold.

## 2020-12-05 NOTE — PROGRESS NOTES
Admit Date: 12/2/2020    Subjective:     Lying in bed comfortable not seen by pulmonary yet     Objective:     No data found. I/O last 3 completed shifts: In: 360 [P.O.:360]  Out: 1500 [Urine:1500]  I/O this shift:  In: 60 [P.O.:60]  Out: -     HEENT: Normal  NECK: Thyroid normal. No carotid bruit. No lymphphadenopathy. CVS: RRR  RS: Clear. No wheeze. No rhonchi. ABD: Soft. Non tender. No mass. Normal BS. EXT: No edema. Non tender. Pulses present.    NEURO: period of confusion       Scheduled Meds:   sodium polystyrene  15 g Oral Once    influenza virus vaccine  0.5 mL Intramuscular Prior to discharge    atenolol  50 mg Oral Daily    atorvastatin  20 mg Oral Daily    clopidogrel  75 mg Oral Daily    ferrous sulfate  325 mg Oral BID WC    gemfibrozil  600 mg Oral BID AC    glipiZIDE  5 mg Oral QAM AC    metFORMIN  850 mg Oral BID    pantoprazole  40 mg Oral QAM AC    vitamin C  500 mg Oral BID    [START ON 12/6/2020] vitamin D  50,000 Units Oral Weekly     Continuous Infusions:   dextrose      sodium chloride 75 mL/hr at 12/03/20 0011       CBC with Differential:    Lab Results   Component Value Date    WBC 7.6 12/02/2020    RBC 3.04 12/02/2020    HGB 9.5 12/02/2020    HCT 29.1 12/02/2020     12/02/2020    MCV 95.7 12/02/2020    MCH 31.3 12/02/2020    MCHC 32.6 12/02/2020    RDW 15.2 12/02/2020    LYMPHOPCT 16.5 12/02/2020    MONOPCT 14.0 12/02/2020    BASOPCT 0.7 12/02/2020    MONOSABS 1.07 12/02/2020    LYMPHSABS 1.26 12/02/2020    EOSABS 0.47 12/02/2020    BASOSABS 0.05 12/02/2020     CMP:    Lab Results   Component Value Date     12/04/2020    K 5.8 12/04/2020    K 6.4 12/02/2020     12/04/2020    CO2 16 12/04/2020    BUN 35 12/04/2020    CREATININE 2.1 12/04/2020    GFRAA 37 12/04/2020    LABGLOM 31 12/04/2020    PROT 7.7 12/02/2020    LABALBU 3.9 12/02/2020    CALCIUM 8.6 12/04/2020    BILITOT <0.2 12/02/2020    ALKPHOS 85 12/02/2020    AST 10 12/02/2020    ALT 7 12/02/2020 PT/INR:    Lab Results   Component Value Date    PROTIME 13.1 04/06/2020    INR 1.2 04/06/2020       Assessment:     Active Problems:    JOHAN (acute kidney injury) ([facility] [address]    Hyperkalemia    covid 19    Anemia chronic disease no active bleeding    Lung mass     Metastatic disease       Plan:   Continue IV fluid await pulmonary input , will get oncology on board

## 2020-12-05 NOTE — PROGRESS NOTES
POCT glucose at 2255 was less than 40. Patient alert and oriented. Denies SOB, Denies pain. States that he is 'cold'. Dinner tray on bedside table untouched. With assistance, pt ate 50% of the dinner dessert and approximately 4 oz of ginger ale. 2 tubes (30g) of glutose gel administered PO, per protocol. IV access lost d/t leaking/bleeding at site. Will attempt another site. STAT random glucose drawn and sent to lab.

## 2020-12-05 NOTE — PROGRESS NOTES
Physician Progress Note      PATIENT:               Geovanna Brooke  CSN #:                  [id number]  :                       1940  ADMIT DATE:       2020 12:25 PM  [address] [place] DATE:  RESPONDING  PROVIDER #:        Teresa Stauffer MD          QUERY TEXT:    Dear Dr. Nu Montanez,    Pt admitted with GI Bleed and has anemia documented. If possible, please   document in progress notes and discharge summary further specificity regarding   the acuity and type of anemia:      The medical record reflects the following:  Risk Factors: PMH cancer with current apparent metastatic disease  Clinical Indicators: H&H 29.1 and 9.5, Sent to ED by nursing home for apparent   worsening GI bleed  Treatment: CT Abdomen    Thank you,  Sarah MOREJON, RN  Clinical Documentation Improvement  [email or web address]  Office #: [phone]  Cell #: [phone]  Options provided:  -- Anemia due to acute blood loss  -- Anemia due to chronic blood loss  -- Anemia due to acute on chronic blood loss  -- Other - I will add my own diagnosis  -- Disagree - Not applicable / Not valid  -- Disagree - Clinically unable to determine / Unknown  -- Refer to Clinical Documentation Reviewer    PROVIDER RESPONSE TEXT:    This patient has chronic blood loss anemia.     Query created by: Brittni Canales on 2020 2:58 PM      Electronically signed by:  Teresa Stauffer MD 2020 8:59 PM

## 2020-12-05 NOTE — PLAN OF CARE
Problem: Airway Clearance - Ineffective  Goal: Achieve or maintain patent airway  Outcome: Met This Shift     Problem: Gas Exchange - Impaired  Goal: Absence of hypoxia  Outcome: Met This Shift     Problem: Gas Exchange - Impaired  Goal: Promote optimal lung function  Outcome: Met This Shift     Problem: Breathing Pattern - Ineffective  Goal: Ability to achieve and maintain a regular respiratory rate  Outcome: Met This Shift     Problem:  Body Temperature -  Risk of, Imbalanced  Goal: Ability to maintain a body temperature within defined limits  Outcome: Met This Shift

## 2020-12-06 LAB
ANION GAP SERPL CALCULATED.3IONS-SCNC: 10 MMOL/L (ref 7–16)
ANION GAP SERPL CALCULATED.3IONS-SCNC: 11 MMOL/L (ref 7–16)
BUN BLDV-MCNC: 26 MG/DL (ref 8–23)
BUN BLDV-MCNC: 27 MG/DL (ref 8–23)
CALCIUM SERPL-MCNC: 8 MG/DL (ref 8.6–10.2)
CALCIUM SERPL-MCNC: 8.2 MG/DL (ref 8.6–10.2)
CHLORIDE BLD-SCNC: 104 MMOL/L (ref 98–107)
CHLORIDE BLD-SCNC: 109 MMOL/L (ref 98–107)
CO2: 15 MMOL/L (ref 22–29)
CO2: 18 MMOL/L (ref 22–29)
CREAT SERPL-MCNC: 1.6 MG/DL (ref 0.7–1.2)
CREAT SERPL-MCNC: 1.7 MG/DL (ref 0.7–1.2)
GFR AFRICAN AMERICAN: 47
GFR AFRICAN AMERICAN: 51
GFR NON-AFRICAN AMERICAN: 39 ML/MIN/1.73
GFR NON-AFRICAN AMERICAN: 42 ML/MIN/1.73
GLUCOSE BLD-MCNC: 134 MG/DL (ref 74–99)
GLUCOSE BLD-MCNC: 136 MG/DL (ref 74–99)
METER GLUCOSE: 116 MG/DL (ref 74–99)
METER GLUCOSE: 144 MG/DL (ref 74–99)
METER GLUCOSE: 93 MG/DL (ref 74–99)
POTASSIUM SERPL-SCNC: 5.1 MMOL/L (ref 3.5–5)
POTASSIUM SERPL-SCNC: 5.5 MMOL/L (ref 3.5–5)
SODIUM BLD-SCNC: 132 MMOL/L (ref 132–146)
SODIUM BLD-SCNC: 135 MMOL/L (ref 132–146)

## 2020-12-06 PROCEDURE — 6370000000 HC RX 637 (ALT 250 FOR IP): Performed by: INTERNAL MEDICINE

## 2020-12-06 PROCEDURE — 2060000000 HC ICU INTERMEDIATE R&B

## 2020-12-06 PROCEDURE — 2580000003 HC RX 258: Performed by: INTERNAL MEDICINE

## 2020-12-06 PROCEDURE — 93005 ELECTROCARDIOGRAM TRACING: CPT | Performed by: INTERNAL MEDICINE

## 2020-12-06 PROCEDURE — 82962 GLUCOSE BLOOD TEST: CPT

## 2020-12-06 PROCEDURE — 6360000002 HC RX W HCPCS: Performed by: INTERNAL MEDICINE

## 2020-12-06 PROCEDURE — 80048 BASIC METABOLIC PNL TOTAL CA: CPT

## 2020-12-06 PROCEDURE — 2700000000 HC OXYGEN THERAPY PER DAY

## 2020-12-06 PROCEDURE — 2500000003 HC RX 250 WO HCPCS: Performed by: INTERNAL MEDICINE

## 2020-12-06 PROCEDURE — 36415 COLL VENOUS BLD VENIPUNCTURE: CPT

## 2020-12-06 RX ORDER — IBUPROFEN 200 MG
400 TABLET ORAL ONCE
Status: COMPLETED | OUTPATIENT
Start: 2020-12-06 | End: 2020-12-06

## 2020-12-06 RX ADMIN — ATORVASTATIN CALCIUM 20 MG: 20 TABLET, FILM COATED ORAL at 09:53

## 2020-12-06 RX ADMIN — IBUPROFEN 400 MG: 200 TABLET, FILM COATED ORAL at 21:45

## 2020-12-06 RX ADMIN — FERROUS SULFATE TAB 325 MG (65 MG ELEMENTAL FE) 325 MG: 325 (65 FE) TAB at 09:54

## 2020-12-06 RX ADMIN — OXYCODONE HYDROCHLORIDE AND ACETAMINOPHEN 500 MG: 500 TABLET ORAL at 12:51

## 2020-12-06 RX ADMIN — GLIPIZIDE 5 MG: 5 TABLET ORAL at 06:17

## 2020-12-06 RX ADMIN — SODIUM BICARBONATE: 84 INJECTION, SOLUTION INTRAVENOUS at 11:58

## 2020-12-06 RX ADMIN — DEXAMETHASONE SODIUM PHOSPHATE 2 MG: 4 INJECTION, SOLUTION INTRA-ARTICULAR; INTRALESIONAL; INTRAMUSCULAR; INTRAVENOUS; SOFT TISSUE at 16:13

## 2020-12-06 RX ADMIN — PANTOPRAZOLE SODIUM 40 MG: 40 TABLET, DELAYED RELEASE ORAL at 16:13

## 2020-12-06 RX ADMIN — METFORMIN HYDROCHLORIDE 850 MG: 850 TABLET ORAL at 09:53

## 2020-12-06 RX ADMIN — GEMFIBROZIL 600 MG: 600 TABLET ORAL at 06:57

## 2020-12-06 RX ADMIN — OXYCODONE HYDROCHLORIDE AND ACETAMINOPHEN 500 MG: 500 TABLET ORAL at 21:45

## 2020-12-06 RX ADMIN — PANTOPRAZOLE SODIUM 40 MG: 40 TABLET, DELAYED RELEASE ORAL at 06:18

## 2020-12-06 RX ADMIN — ATENOLOL 50 MG: 50 TABLET ORAL at 09:54

## 2020-12-06 RX ADMIN — FERROUS SULFATE TAB 325 MG (65 MG ELEMENTAL FE) 325 MG: 325 (65 FE) TAB at 16:13

## 2020-12-06 RX ADMIN — GEMFIBROZIL 600 MG: 600 TABLET ORAL at 16:13

## 2020-12-06 RX ADMIN — DEXAMETHASONE SODIUM PHOSPHATE 2 MG: 4 INJECTION, SOLUTION INTRA-ARTICULAR; INTRALESIONAL; INTRAMUSCULAR; INTRAVENOUS; SOFT TISSUE at 00:27

## 2020-12-06 RX ADMIN — ACETAMINOPHEN 650 MG: 325 TABLET ORAL at 21:13

## 2020-12-06 RX ADMIN — ERGOCALCIFEROL 50000 UNITS: 1.25 CAPSULE ORAL at 09:53

## 2020-12-06 RX ADMIN — DEXAMETHASONE SODIUM PHOSPHATE 2 MG: 4 INJECTION, SOLUTION INTRA-ARTICULAR; INTRALESIONAL; INTRAMUSCULAR; INTRAVENOUS; SOFT TISSUE at 09:54

## 2020-12-06 ASSESSMENT — PAIN SCALES - GENERAL
PAINLEVEL_OUTOF10: 8
PAINLEVEL_OUTOF10: 8
PAINLEVEL_OUTOF10: 0
PAINLEVEL_OUTOF10: 0
PAINLEVEL_OUTOF10: 7
PAINLEVEL_OUTOF10: 8

## 2020-12-06 ASSESSMENT — PAIN DESCRIPTION - LOCATION: LOCATION: CHEST

## 2020-12-06 ASSESSMENT — PAIN DESCRIPTION - ORIENTATION: ORIENTATION: MID

## 2020-12-06 ASSESSMENT — PAIN DESCRIPTION - PAIN TYPE: TYPE: ACUTE PAIN

## 2020-12-06 NOTE — PLAN OF CARE
Problem: Airway Clearance - Ineffective  Goal: Achieve or maintain patent airway  12/5/2020 2023 by Constantine Prieto RN  Outcome: Met This Shift  12/5/2020 1536 by Rosaura Bustos RN  Outcome: Met This Shift     Problem: Gas Exchange - Impaired  Goal: Absence of hypoxia  12/5/2020 2023 by Constantine Prieto RN  Outcome: Met This Shift  12/5/2020 1536 by Rosaura Bustos RN  Outcome: Met This Shift  Goal: Promote optimal lung function  12/5/2020 2023 by Constantine Prieto RN  Outcome: Met This Shift  12/5/2020 1536 by Rosaura Bustos RN  Outcome: Met This Shift     Problem: Breathing Pattern - Ineffective  Goal: Ability to achieve and maintain a regular respiratory rate  12/5/2020 2023 by Constantine Prieto RN  Outcome: Met This Shift  12/5/2020 1536 by Rosaura Bustos RN  Outcome: Met This Shift     Problem:  Body Temperature -  Risk of, Imbalanced  Goal: Ability to maintain a body temperature within defined limits  12/5/2020 2023 by Constantine Prieto RN  Outcome: Met This Shift  12/5/2020 1536 by Rosaura Bustos RN  Outcome: Met This Shift  Goal: Will regain or maintain usual level of consciousness  Outcome: Met This Shift  Goal: Complications related to the disease process, condition or treatment will be avoided or minimized  Outcome: Met This Shift     Problem: Isolation Precautions - Risk of Spread of Infection  Goal: Prevent transmission of infection  Outcome: Met This Shift     Problem: Nutrition Deficits  Goal: Optimize nutrtional status  Outcome: Met This Shift     Problem: Risk for Fluid Volume Deficit  Goal: Maintain normal heart rhythm  Outcome: Met This Shift  Goal: Maintain absence of muscle cramping  Outcome: Met This Shift  Goal: Maintain normal serum potassium, sodium, calcium, phosphorus, and pH  Outcome: Met This Shift     Problem: Loneliness or Risk for Loneliness  Goal: Demonstrate positive use of time alone when socialization is not possible  Outcome: Met This Shift     Problem: Fatigue  Goal: Verbalize increase energy and improved vitality  Outcome: Met This Shift     Problem: Patient Education: Go to Patient Education Activity  Goal: Patient/Family Education  Outcome: Met This Shift     Problem: Skin Integrity:  Goal: Will show no infection signs and symptoms  Description: Will show no infection signs and symptoms  Outcome: Met This Shift  Goal: Absence of new skin breakdown  Description: Absence of new skin breakdown  Outcome: Met This Shift     Problem: Falls - Risk of:  Goal: Will remain free from falls  Description: Will remain free from falls  Outcome: Met This Shift  Goal: Absence of physical injury  Description: Absence of physical injury  Outcome: Met This Shift

## 2020-12-06 NOTE — PROGRESS NOTES
General Progress Note  12/6/2020 7:38 AM  Subjective:   Admit Date: 12/2/2020  PCP: Sharon Philip MD  Interval History: patient with recurrent hyperkalemia, will have Renal evaluate. Awake and alert, appears to be in no acute distress. Diet: DIET CARB CONTROL;  Pain is:None  Nausea:None  Bowel Movement/Flatus yes    Data:   Scheduled Meds:   pantoprazole  40 mg Oral BID AC    dexamethasone  2 mg Intravenous Q8H    sodium polystyrene  15 g Oral Once    influenza virus vaccine  0.5 mL Intramuscular Prior to discharge    atenolol  50 mg Oral Daily    atorvastatin  20 mg Oral Daily    [Held by provider] clopidogrel  75 mg Oral Daily    ferrous sulfate  325 mg Oral BID WC    gemfibrozil  600 mg Oral BID AC    glipiZIDE  5 mg Oral QAM AC    metFORMIN  850 mg Oral BID    vitamin C  500 mg Oral BID    vitamin D  50,000 Units Oral Weekly     Continuous Infusions:   dextrose Stopped (12/05/20 0856)    sodium chloride 75 mL/hr at 12/05/20 1047     PRN Meds:glucose, dextrose, glucagon (rDNA), dextrose, acetaminophen, aluminum & magnesium hydroxide-simethicone, nitroGLYCERIN  I/O last 3 completed shifts: In: 180 [P.O.:180]  Out: 1925 [Urine:1925]  No intake/output data recorded.     Intake/Output Summary (Last 24 hours) at 12/6/2020 0738  Last data filed at 12/6/2020 0448  Gross per 24 hour   Intake 180 ml   Output 1925 ml   Net -1745 ml       CBC with Differential:    Lab Results   Component Value Date    WBC 5.8 12/05/2020    RBC 2.66 12/05/2020    HGB 8.1 12/05/2020    HCT 26.4 12/05/2020    HCT 26.6 12/05/2020     12/05/2020    MCV 99.2 12/05/2020    MCH 30.5 12/05/2020    MCHC 30.7 12/05/2020    RDW 15.6 12/05/2020    LYMPHOPCT 16.5 12/02/2020    MONOPCT 14.0 12/02/2020    BASOPCT 0.7 12/02/2020    MONOSABS 1.07 12/02/2020    LYMPHSABS 1.26 12/02/2020    EOSABS 0.47 12/02/2020    BASOSABS 0.05 12/02/2020     CMP:    Lab Results   Component Value Date     12/06/2020    K 5.5 12/06/2020    K oriented, thought content appropriate    Assessment:   Principal Problem:    JOHAN (acute kidney injury) ([facility] [address]  Active Problems:    Spinal stenosis, lumbar region, without neurogenic claudication    HTN (hypertension)    Type 2 diabetes mellitus ([address]    Anemia    COVID-19 virus infection    COPD (chronic obstructive pulmonary disease) (HCC)    GI bleed  Resolved Problems:    * No resolved hospital problems. *    Plan:   1. Continue present care. 2. Renal consulted, , regarding hyperkalemia. 3. Dr. Marilee Downey to see in a.m.. 4. Hem/Onc on case, Dr. Lucas Mckenzie.     Britni Woo D.O.  7:38 AM  12/6/2020

## 2020-12-06 NOTE — CONSULTS
dyspnea  CARDIOVASCULAR:  negative for  chest pain  GASTROINTESTINAL:  negative for nausea, vomiting and diarrhea  GENITOURINARY:  negative for frequency, dysuria and nocturia  INTEGUMENT/BREAST:  negative  HEMATOLOGIC/LYMPHATIC:  negative for easy bruising  ALLERGIC/IMMUNOLOGIC:  positive for drug reactions  ENDOCRINE:  negative  MUSCULOSKELETAL:  negative  NEUROLOGICAL:  negative for headaches and seizures    PHYSICAL EXAM:      Vitals:    VITALS:  /76   Pulse 77   Temp 96.8 °F (36 °C) (Axillary)   Resp 16   SpO2 93%   24HR INTAKE/OUTPUT:    Intake/Output Summary (Last 24 hours) at 12/6/2020 1341  Last data filed at 12/6/2020 1221  Gross per 24 hour   Intake 855 ml   Output 1225 ml   Net -370 ml       Constitutional: Patient is awake alert in no distress  HEENT: Pupils are equal reactive  Respiratory: Lungs are clear  Cardiovascular/Edema: Heart sounds RRR  Gastrointestinal: Abdomen soft  Neurologic: Nonfocal  Skin: No lesions  Other: No edema    DATA:    CBC:   Lab Results   Component Value Date    WBC 5.8 12/05/2020    RBC 2.66 12/05/2020    HGB 8.1 12/05/2020    HCT 26.4 12/05/2020    HCT 26.6 12/05/2020    MCV 99.2 12/05/2020    MCH 30.5 12/05/2020    MCHC 30.7 12/05/2020    RDW 15.6 12/05/2020     12/05/2020    MPV 9.6 12/05/2020     CMP:    Lab Results   Component Value Date     12/06/2020    K 5.5 12/06/2020    K 6.4 12/02/2020     12/06/2020    CO2 15 12/06/2020    BUN 27 12/06/2020    CREATININE 1.7 12/06/2020    GFRAA 47 12/06/2020    LABGLOM 39 12/06/2020    GLUCOSE 136 12/06/2020    PROT 7.7 12/02/2020    LABALBU 3.9 12/02/2020    CALCIUM 8.2 12/06/2020    BILITOT <0.2 12/02/2020    ALKPHOS 85 12/02/2020    AST 10 12/02/2020    ALT 7 12/02/2020     Magnesium:  No results found for: MG  Phosphorus:  No results found for: PHOS       Radiology Review:      CT chest without IV contrast December 2, 2020   Enlarging pleural based left lower lobe mass and interval development of extensive mediastinal and bilateral hilar adenopathy, right    suprarenal/adrenal mass. Rosan Laundry are most compatible with metastatic process. Peripheral interstitial fibrosis of the lungs. The findings were sent to the Radiology Results [address] at 3:37    pm on 12/2/2020to be communicated to a licensed caregiver. CT abdomen and pelvis without IV contrast December 2, 2020   1. Evidence of metastatic disease in the abdomen and pelvis which has    developed in the interim since 03/18/2020.    2. Large right adrenal mass measuring 4.9 x 3.6 cm, likely metastatic.    3. 1.7 cm left adrenal mass likely metastatic.  Multiple left adrenal    adenomas. 4. Solitary enlarged retrocaval lymph node, likely metastatic. 5.  Liquid stool in the colon indicates a diarrheal illness.  No bowel wall    thickening or obstruction noted. IMPRESSION/RECOMMENDATIONS:      Briefly Mr. Tana Alaniz is a 80-year-old man with history of CKD stage 3, baseline creatinine 1.8-2 mg/dl, HTN, type II DM, CVA, dementia, recently diagnosed with COVID-19, who was referred to the ER from [address] [place] [address] for evaluation of GI bleeding. In the ER she was found to have a CT scan of the chest which show extensive mediastinal and hilar lymphadenopathy as well as a mass in the left lower lobe lung and multiple lesions of the abdomen consistent with metastatic disease. On admission case creatinine level of 3.1 mg/dL, potassium level 6.4 mEq/L and bicarbonate level of 15 mEq/L; with IV fluid resuscitation his renal function has improved but he continues to have persistent hyperkalemia metabolic acidosis, resolved for this consultation. His medications prior to admission including Metformin, lisinopril. 1. JOHAN on CKD, volume responsive prerenal JOHAN, multifactorial including poor intake, GI bleed in setting of ACE inhibition. Renal function has improved with IV fluids  2.  CKD stage III, probably due to underlying nephrosclerosis versus diabetic kidney disease, baseline creatinine 1.8-2 mg/dL  3. Hyperkalemia, multifactorial, secondary to #1, ACE inhibition and possibly adrenal insufficiency  4. Low bicarbonate levels with hyperkalemia, likely non-anion gap metabolic acidosis, 2/2 CKD with decrease ammoniogenesis, ACE inhibition, diarrhea?,  adrenal insufficiency? 5. Metastatic cancer, probably primary lung CA  6. HTN, on atenolol  7. Rule out adrenal insufficiency, 2/2  metastatic disease, presently on dexamethasone    _______________________________________________________________________  8. Anemia, rule out GI bleeding, on pantoprazole  9. COVID-19 pneumonia, on dexamethasone  10. Type II DM, on glipizide, Metformin  11. Hyperlipidemia, on atorvastatin, gemfibrozil    Plan:    · Change IV fluids to D5W with 150 mEq of sodium bicarbonate at 125 cc/hour  · Monitor bicarbonate levels  · Monitor renal function  · Monitor potassium levels  · Monitor H&H  · Obtain cortisol levels in a.m. · Continue dexamethasone  · Hold Metformin    Thank you very much Dr. Arti Davis for allowing us to participate in the care of Mr. Mckeon.

## 2020-12-06 NOTE — PROGRESS NOTES
Pulmonary [facility]                             Pulmonary Consult/Progress Note :                    Reason for Consultation:Lung mass   CC : GI Bleed   HPI:  Still with SOB   On 5 L   Poor historian         PAST MEDICAL HISTORY:     Past Medical History:   Diagnosis Date    Chronic back pain     Hyperlipidemia     Hypertension     Osteoarthritis     Type II or unspecified type diabetes mellitus without mention of complication, not stated as uncontrolled        PAST SURGICAL HISTORY:   Past Surgical History:   Procedure Laterality Date    CAROTID ENDARTERECTOMY  2008    COLONOSCOPY      COLONOSCOPY N/A 4/16/2018    COLONOSCOPY WITH BIOPSY performed by Amy Mason MD at [place] [address]  4/16/2018    COLONOSCOPY CONTROL HEMORRHAGE performed by Amy Mason MD at [address] [place] [address], DIAGNOSTIC  02/05/2017    [address]   2002    by Dr Jaguar Lucas N/A 4/14/2018    EGD BIOPSY performed by Jimmie Reis DO at [address] [place] [address]:   History reviewed. No pertinent family history.     SOCIAL HISTORY:   Social History     Socioeconomic History    Marital status: Legally      Spouse name: Not on file    Number of children: Not on file    Years of education: Not on file    Highest education level: Not on file   Occupational History    Not on file   Social Needs    Financial resource strain: Not on file    Food insecurity     Worry: Not on file     Inability: Not on file    Transportation needs     Medical: Not on file     Non-medical: Not on file   Tobacco Use    Smoking status: Current Every Day Smoker     Packs/day: 2.00     Types: Cigarettes    Smokeless tobacco: Never Used   Substance and Sexual Activity    Alcohol use: No    Drug use: No    Sexual activity: Not Currently   Lifestyle    Physical activity     Days per week: Not on file     Minutes per session: Not on file    Stress: Not on file   Relationships    Social connections     Talks on phone: Not on file     Gets together: Not on file     Attends Jainism service: Not on file     Active member of club or organization: Not on file     Attends meetings of clubs or organizations: Not on file     Relationship status: Not on file    Intimate partner violence     Fear of current or ex partner: Not on file     Emotionally abused: Not on file     Physically abused: Not on file     Forced sexual activity: Not on file   Other Topics Concern    Not on file   Social History Narrative    Not on file     Social History     Tobacco Use   Smoking Status Current Every Day Smoker    Packs/day: 2.00    Types: Cigarettes   Smokeless Tobacco Never Used     Social History     Substance and Sexual Activity   Alcohol Use No     Social History     Substance and Sexual Activity   Drug Use No         HOME MEDICATIONS:  Prior to Admission medications    Medication Sig Start Date End Date Taking?  Authorizing Provider   gemfibrozil (LOPID) 600 MG tablet Take 600 mg by mouth 2 times daily   Yes Historical Provider, MD   metFORMIN (GLUCOPHAGE) 850 MG tablet Take 850 mg by mouth 2 times daily   Yes Historical Provider, MD   pantoprazole (PROTONIX) 40 MG tablet Take 40 mg by mouth daily   Yes Historical Provider, MD   Acetaminophen (APAP) 325 MG TABS Take 650 mg by mouth every 6 hours as needed for Pain or Fever   Yes Historical Provider, MD   camphor-menthol (SARNA) 0.5-0.5 % lotion Apply 1 Bottle topically every 6 hours as needed for Itching To the Chest   Yes Historical Provider, MD   vitamin D (ERGOCALCIFEROL) 1.25 MG (94326 UT) CAPS capsule Take 50,000 Units by mouth once a week Sundays   Yes Historical Provider, MD   ferrous sulfate (IRON 325) 325 (65 Fe) MG tablet Take 325 mg by mouth 2 times daily   Yes Historical Provider, MD   aluminum & magnesium hydroxide-simethicone (MAALOX) 817-974-13 MG/5ML SUSP suspension Take 30 mLs by mouth as needed (Epigastric Pain)   Yes Historical Provider, MD   nitroGLYCERIN (NITROSTAT) 0.4 MG SL tablet Place 0.4 mg under the tongue every 5 minutes as needed for Chest pain up to max of 3 total doses. Yes Historical Provider, MD   vitamin C (ASCORBIC ACID) 500 MG tablet Take 500 mg by mouth 2 times daily   Yes Historical Provider, MD   glipiZIDE (GLUCOTROL) 5 MG tablet Take 1 tablet by mouth every morning (before breakfast) 3/20/20  Yes Mikey Davalos MD   atorvastatin (LIPITOR) 20 MG tablet Take 1 tablet by mouth daily 3/19/20  Yes Mikey Davalos MD   clopidogrel (PLAVIX) 75 MG tablet Take 75 mg by mouth daily   Yes Historical Provider, MD   atenolol (TENORMIN) 50 MG tablet Take 50 mg by mouth daily. Yes Historical Provider, MD   lisinopril (PRINIVIL;ZESTRIL) 40 MG tablet Take 40 mg by mouth daily.    Yes Historical Provider, MD       CURRENT MEDICATIONS:  Current Facility-Administered Medications: pantoprazole (PROTONIX) tablet 40 mg, 40 mg, Oral, BID AC  dexamethasone (DECADRON) injection 2 mg, 2 mg, Intravenous, Q8H  sodium polystyrene (KAYEXALATE) 15 GM/60ML suspension 15 g, 15 g, Oral, Once  glucose (GLUTOSE) 40 % oral gel 15 g, 15 g, Oral, PRN  dextrose 50 % IV solution, 12.5 g, Intravenous, PRN  glucagon (rDNA) injection 1 mg, 1 mg, Intramuscular, PRN  dextrose 5 % solution, 100 mL/hr, Intravenous, PRN  influenza A&B vaccine (FLUAD QUADRIVALENT) injection 0.5 mL, 0.5 mL, Intramuscular, Prior to discharge  acetaminophen (TYLENOL) tablet 650 mg, 650 mg, Oral, Q6H PRN  aluminum & magnesium hydroxide-simethicone (MAALOX) 200-200-20 MG/5ML suspension 30 mL, 30 mL, Oral, Daily PRN  atenolol (TENORMIN) tablet 50 mg, 50 mg, Oral, Daily  atorvastatin (LIPITOR) tablet 20 mg, 20 mg, Oral, Daily  [Held by provider] clopidogrel (PLAVIX) tablet 75 mg, 75 mg, Oral, Daily  ferrous sulfate (IRON 325) tablet 325 mg, 325 mg, Oral, BID WC  gemfibrozil (LOPID) tablet 600 mg, 600 mg, Oral, BID AC  glipiZIDE (GLUCOTROL) tablet 5 mg, 5 mg, Oral, QAM AC  metFORMIN (GLUCOPHAGE) tablet 850 mg, 850 mg, Oral, BID  nitroGLYCERIN (NITROSTAT) SL tablet 0.4 mg, 0.4 mg, Sublingual, Q5 Min PRN  vitamin C (ASCORBIC ACID) tablet 500 mg, 500 mg, Oral, BID  [START ON 12/6/2020] vitamin D (ERGOCALCIFEROL) capsule 50,000 Units, 50,000 Units, Oral, Weekly  0.9 % sodium chloride infusion, , Intravenous, Continuous    IV MEDICATIONS:   dextrose Stopped (12/05/20 0856)    sodium chloride 75 mL/hr at 12/05/20 1047       ALLERGIES:  Allergies   Allergen Reactions    Sulfa Antibiotics        REVIEW OF SYSTEMS:  General ROS:  No weight loss ,no fatigue     ENT ROS:   No Sore throat ,no lymphoadenopathy,no nasal stuffiness     Hematological and Lymphatic ROS:   No ecchymosis ,no tendency to bleed  Respiratory ROS:   SOB   Cardiovascular ROS:   No CP,No Palpitation   Gastrointestinal ROS:   +Gi bleed,no nausea or vomiting      - Musculoskeletal ROS:      - no joint swelling ,no joint pain   Neurological ROS:     -no weakness or numbness    Dermatological ROS:   No skin rash ,no urticaria     PHYSICAL EXAMINATION:     VITAL SIGNS:  BP (!) 142/63   Pulse 71   Temp 97.8 °F (36.6 °C) (Oral)   Resp 20   SpO2 100%   Wt Readings from Last 3 Encounters:   04/06/20 170 lb (77.1 kg)   03/17/20 170 lb (77.1 kg)   04/14/18 126 lb 12.8 oz (57.5 kg)     Temp Readings from Last 3 Encounters:   12/05/20 97.8 °F (36.6 °C) (Oral)   04/07/20 98.1 °F (36.7 °C)   03/20/20 98.7 °F (37.1 °C) (Temporal)     TMAX:  BP Readings from Last 3 Encounters:   12/05/20 (!) 142/63   04/07/20 (!) 143/76   03/20/20 (!) 168/72     Pulse Readings from Last 3 Encounters:   12/05/20 71   04/07/20 81   03/20/20 86           INTAKE/OUTPUTS:  I/O last 3 completed shifts:   In: 240 [P.O.:240]  Out: 1550 [Urine:1550]    Intake/Output Summary (Last 24 hours) at 12/5/2020 2327  Last data filed at 12/5/2020 2108  Gross per 24 hour   Intake 240 ml   Output 1550 ml   Net -1310 ml General Appearance: confused   well-nourished, in no acute distress   Eyes: pupils equal, round, and reactive to light, extraocular eye movements intact, conjunctivae normal and sclera anicteric   Neck: neck supple and non tender without mass, no thyromegaly, no thyroid nodules and no cervical adenopathy   Pulmonary/Chest:*SOB *   Cardiovascular: normal rate, regular rhythm, normal S1 and S2, no murmurs, rubs, clicks or gallops, distal pulses intact, no carotid bruits, no murmurs, no gallops, no carotid bruits and no JVD   Abdomen: obese, soft, non-tender, non-distended, normal bowel sounds, no masses or organomegaly   Extremities:no edema   Musculoskeletal: normal range of motion, no joint swelling, deformity or tenderness   Neurologic: reflexes normal and symmetric, no cranial nerve deficit noted    LABS/IMAGING:    CBC:  Lab Results   Component Value Date    WBC 5.8 12/05/2020    HGB 8.1 (L) 12/05/2020    HCT 26.4 (L) 12/05/2020    HCT 26.6 (L) 12/05/2020    MCV 99.2 12/05/2020     12/05/2020    LYMPHOPCT 16.5 (L) 12/02/2020    RBC 2.66 (L) 12/05/2020    MCH 30.5 12/05/2020    MCHC 30.7 (L) 12/05/2020    RDW 15.6 (H) 12/05/2020    NEUTOPHILPCT 60.0 12/02/2020    MONOPCT 14.0 (H) 12/02/2020    BASOPCT 0.7 12/02/2020    NEUTROABS 4.57 12/02/2020    LYMPHSABS 1.26 (L) 12/02/2020    MONOSABS 1.07 (H) 12/02/2020    EOSABS 0.47 12/02/2020    BASOSABS 0.05 12/02/2020       Recent Labs     12/05/20  0843 12/02/20  1242   WBC 5.8 7.6   HGB 8.1* 9.5*   HCT 26.6*  26.4* 29.1*   MCV 99.2 95.7    277       BMP:   Recent Labs     12/04/20  0000 12/05/20  0843    128*   K 5.8* 5.2*   * 101   CO2 16* 14*   BUN 35* 31*   CREATININE 2.1* 1.9*       MG: No results found for: MG  Ca/Phos:   Lab Results   Component Value Date    CALCIUM 8.3 (L) 12/05/2020     Amylase: No results found for: AMYLASE  Lipase:   Lab Results   Component Value Date    LIPASE 23 04/06/2020     LIVER PROFILE:   No results for input(s): AST, ALT, LIPASE, BILIDIR, BILITOT, ALKPHOS in the last 72 hours. Invalid input(s): AMYLASE,  ALB    PT/INR: No results for input(s): PROTIME, INR in the last 72 hours. APTT: No results for input(s): APTT in the last 72 hours. Cardiac Enzymes:  Lab Results   Component Value Date    CKTOTAL 92 05/15/2017    TROPONINI 0.06 (H) 12/02/2020                   PROBLEM LIST:  Patient Active Problem List   Diagnosis    Back pain    Spinal stenosis, lumbar region, without neurogenic claudication    Radiculopathy, lumbar region    Lumbar spine instability    [facility]ipital stroke ([facility]    HTN (hypertension)    Type 2 diabetes mellitus (HCC)    Chest pain    Iron deficiency anemia due to chronic blood loss    Anemia    Epigastric pain    JOHAN (acute kidney injury) ([facility]    COVID-19 virus infection    COPD (chronic obstructive pulmonary disease) ([facility]    GI bleed               ASSESSMENT:  1.) Lung Masscancer most likely    2. )COPD   3. )COVID Pneumonia  4. )Hypoxia   5.)Gi Bleed      PLAN:  *-GI bleed as Per primary, hemoglobin seems stable defer surgical evaluation scopes to them  *-continue Decadron   *- creatinine elevated ,will assess with pharmcy if he it can be given ,with his worsen kidney function   will let him recover from Covid, then  we will do bronchoscopy with EBUS for station 7 for diagnosis and for staging or we can biopsy his adrenal mass for CT-guided and that we get also staging and diagnosis    In either case we have to wait 2 weeks until he is Covid free  With possible GI bleed will hold on Jimena Avila MD,[facility]  P[facility]&Critical Care Medicine   Director of [address] [place] [address] Director of [address] [place] [address]    Zulay Johnson    NOTE: This report was transcribed using voice recognition software. Every effort was made to ensure accuracy; however, inadvertent computerized transcription errors may be present.

## 2020-12-06 NOTE — PLAN OF CARE
Problem: Airway Clearance - Ineffective  Goal: Achieve or maintain patent airway  12/6/2020 0246 by April Mcknight RN  Outcome: Ongoing  12/6/2020 0246 by April Mcknight RN  Outcome: Ongoing  12/5/2020 2023 by Katelynn Taylor RN  Outcome: Met This Shift  12/5/2020 1536 by Eleazar Maloney RN  Outcome: Met This Shift     Problem: Gas Exchange - Impaired  Goal: Absence of hypoxia  12/6/2020 0246 by April Mcknight RN  Outcome: Ongoing  12/6/2020 0246 by April Mcknight RN  Outcome: Ongoing  12/5/2020 2023 by Katelynn Taylor RN  Outcome: Met This Shift  12/5/2020 1536 by Eleazar Maloney RN  Outcome: Met This Shift  Goal: Promote optimal lung function  12/6/2020 0246 by April Mcknight RN  Outcome: Ongoing  12/6/2020 0246 by Apirl Mcknight RN  Outcome: Ongoing  12/5/2020 2023 by Katelynn Taylor RN  Outcome: Met This Shift  12/5/2020 1536 by Eleazar Maloney RN  Outcome: Met This Shift     Problem: Breathing Pattern - Ineffective  Goal: Ability to achieve and maintain a regular respiratory rate  12/6/2020 0246 by April Mcknight RN  Outcome: Ongoing  12/6/2020 0246 by April Mcknight RN  Outcome: Ongoing  12/5/2020 2023 by Katelynn Taylor RN  Outcome: Met This Shift  12/5/2020 1536 by Eleazar Maloney RN  Outcome: Met This Shift     Problem:  Body Temperature -  Risk of, Imbalanced  Goal: Ability to maintain a body temperature within defined limits  12/6/2020 0246 by April Mcknight RN  Outcome: Ongoing  12/6/2020 0246 by April Mcknight RN  Outcome: Ongoing  12/5/2020 2023 by Katelynn Taylor RN  Outcome: Met This Shift  12/5/2020 1536 by Eleazar Maloney RN  Outcome: Met This Shift  Goal: Will regain or maintain usual level of consciousness  12/6/2020 0246 by April Mcknight RN  Outcome: Ongoing  12/6/2020 0246 by April Mcknight RN  Outcome: Ongoing  12/5/2020 2023 by Katelynn Taylor RN  Outcome: Met This Shift  Goal: Complications related to the disease process, condition or treatment will be avoided or minimized  12/6/2020 0246 by Fermin Rodríguez RN  Outcome: Ongoing  12/6/2020 0246 by Fermin Rodríguez RN  Outcome: Ongoing  12/5/2020 2023 by Clearance Severin, RN  Outcome: Met This Shift     Problem: Loneliness or Risk for Loneliness  Goal: Demonstrate positive use of time alone when socialization is not possible  12/6/2020 0246 by Fermin Rodríguez RN  Outcome: Ongoing  12/6/2020 0246 by Fermin Rodríguez RN  Outcome: Ongoing  12/5/2020 2023 by Clearance Severin, RN  Outcome: Met This Shift     Problem: Risk for Fluid Volume Deficit  Goal: Maintain normal heart rhythm  12/6/2020 0246 by Fermin Rodríguez RN  Outcome: Ongoing  12/6/2020 0246 by Fermin Rodríguez RN  Outcome: Ongoing  12/5/2020 2023 by Clearance Severin, RN  Outcome: Met This Shift  Goal: Maintain absence of muscle cramping  12/6/2020 0246 by Fermin Rodríguez RN  Outcome: Ongoing  12/6/2020 0246 by Fermin Rodríguez RN  Outcome: Ongoing  12/5/2020 2023 by Clearance Severin, RN  Outcome: Met This Shift  Goal: Maintain normal serum potassium, sodium, calcium, phosphorus, and pH  12/6/2020 0246 by Fermin Rodríguez RN  Outcome: Ongoing  12/6/2020 0246 by Fermin Rodríguez RN  Outcome: Ongoing  12/5/2020 2023 by Clearance Severin, RN  Outcome: Met This Shift     Problem: Nutrition Deficits  Goal: Optimize nutrtional status  12/6/2020 0246 by Fermin Rodríguez RN  Outcome: Ongoing  12/6/2020 0246 by Fermin Rodríguez RN  Outcome: Ongoing  12/5/2020 2023 by Clearance Severin, RN  Outcome: Met This Shift     Problem: Falls - Risk of:  Goal: Will remain free from falls  Description: Will remain free from falls  12/6/2020 0246 by Fermin Rodríguez RN  Outcome: Ongoing  12/6/2020 0246 by Fermin Rodríguez RN  Outcome: Ongoing  12/5/2020 2023 by Clearance Severin, RN  Outcome: Met This Shift  Goal: Absence of physical injury  Description: Absence of physical injury  12/6/2020 0246 by Fermin Rodríguez RN  Outcome: Ongoing  12/6/2020 0246 by Fermin Rodríguez RN  Outcome: Ongoing  12/5/2020 2023 by Emily Rod RN  Outcome: Met This Shift     Problem: Skin Integrity:  Goal: Will show no infection signs and symptoms  Description: Will show no infection signs and symptoms  12/6/2020 0246 by Gwynneth Boast, RN  Outcome: Ongoing  12/6/2020 0246 by Gwynneth Boast, RN  Outcome: Ongoing  12/5/2020 2023 by Emily Rod RN  Outcome: Met This Shift  Goal: Absence of new skin breakdown  Description: Absence of new skin breakdown  12/6/2020 0246 by Gwynneth Boast, RN  Outcome: Ongoing  12/6/2020 0246 by Gwynneth Boast, RN  Outcome: Ongoing  12/5/2020 2023 by Emily Rod RN  Outcome: Met This Shift

## 2020-12-07 LAB
ABO/RH: NORMAL
ANION GAP SERPL CALCULATED.3IONS-SCNC: 8 MMOL/L (ref 7–16)
ANTIBODY SCREEN: NORMAL
BLOOD BANK DISPENSE STATUS: NORMAL
BLOOD BANK PRODUCT CODE: NORMAL
BPU ID: NORMAL
BUN BLDV-MCNC: 26 MG/DL (ref 8–23)
CALCIUM IONIZED: 1.18 MMOL/L (ref 1.15–1.33)
CALCIUM SERPL-MCNC: 8 MG/DL (ref 8.6–10.2)
CHLORIDE BLD-SCNC: 102 MMOL/L (ref 98–107)
CO2: 24 MMOL/L (ref 22–29)
CREAT SERPL-MCNC: 1.5 MG/DL (ref 0.7–1.2)
DESCRIPTION BLOOD BANK: NORMAL
EKG ATRIAL RATE: 74 BPM
EKG P AXIS: 39 DEGREES
EKG P-R INTERVAL: 174 MS
EKG Q-T INTERVAL: 360 MS
EKG QRS DURATION: 76 MS
EKG QTC CALCULATION (BAZETT): 399 MS
EKG R AXIS: 15 DEGREES
EKG T AXIS: 68 DEGREES
EKG VENTRICULAR RATE: 74 BPM
GFR AFRICAN AMERICAN: 54
GFR NON-AFRICAN AMERICAN: 45 ML/MIN/1.73
GLUCOSE BLD-MCNC: 166 MG/DL (ref 74–99)
HCT VFR BLD CALC: 20.7 % (ref 37–54)
HCT VFR BLD CALC: 20.8 % (ref 37–54)
HCT VFR BLD CALC: 26.5 % (ref 37–54)
HEMOGLOBIN: 6.6 G/DL (ref 12.5–16.5)
HEMOGLOBIN: 6.6 G/DL (ref 12.5–16.5)
HEMOGLOBIN: 8.6 G/DL (ref 12.5–16.5)
MAGNESIUM: 1.1 MG/DL (ref 1.6–2.6)
MCH RBC QN AUTO: 29.9 PG (ref 26–35)
MCH RBC QN AUTO: 30.3 PG (ref 26–35)
MCHC RBC AUTO-ENTMCNC: 31.7 % (ref 32–34.5)
MCHC RBC AUTO-ENTMCNC: 31.9 % (ref 32–34.5)
MCV RBC AUTO: 94.1 FL (ref 80–99.9)
MCV RBC AUTO: 95 FL (ref 80–99.9)
PDW BLD-RTO: 15.2 FL (ref 11.5–15)
PDW BLD-RTO: 15.2 FL (ref 11.5–15)
PHOSPHORUS: 2.4 MG/DL (ref 2.5–4.5)
PLATELET # BLD: 230 E9/L (ref 130–450)
PLATELET # BLD: 240 E9/L (ref 130–450)
PMV BLD AUTO: 9.5 FL (ref 7–12)
PMV BLD AUTO: 9.6 FL (ref 7–12)
POTASSIUM SERPL-SCNC: 4.7 MMOL/L (ref 3.5–5)
RBC # BLD: 2.18 E12/L (ref 3.8–5.8)
RBC # BLD: 2.21 E12/L (ref 3.8–5.8)
SODIUM BLD-SCNC: 134 MMOL/L (ref 132–146)
WBC # BLD: 4.4 E9/L (ref 4.5–11.5)
WBC # BLD: 4.6 E9/L (ref 4.5–11.5)

## 2020-12-07 PROCEDURE — 82330 ASSAY OF CALCIUM: CPT

## 2020-12-07 PROCEDURE — 36430 TRANSFUSION BLD/BLD COMPNT: CPT

## 2020-12-07 PROCEDURE — 85014 HEMATOCRIT: CPT

## 2020-12-07 PROCEDURE — 86901 BLOOD TYPING SEROLOGIC RH(D): CPT

## 2020-12-07 PROCEDURE — 93010 ELECTROCARDIOGRAM REPORT: CPT | Performed by: INTERNAL MEDICINE

## 2020-12-07 PROCEDURE — 6370000000 HC RX 637 (ALT 250 FOR IP): Performed by: INTERNAL MEDICINE

## 2020-12-07 PROCEDURE — 99232 SBSQ HOSP IP/OBS MODERATE 35: CPT | Performed by: INTERNAL MEDICINE

## 2020-12-07 PROCEDURE — 83735 ASSAY OF MAGNESIUM: CPT

## 2020-12-07 PROCEDURE — 86850 RBC ANTIBODY SCREEN: CPT

## 2020-12-07 PROCEDURE — 80048 BASIC METABOLIC PNL TOTAL CA: CPT

## 2020-12-07 PROCEDURE — 84100 ASSAY OF PHOSPHORUS: CPT

## 2020-12-07 PROCEDURE — 6360000002 HC RX W HCPCS: Performed by: INTERNAL MEDICINE

## 2020-12-07 PROCEDURE — 2060000000 HC ICU INTERMEDIATE R&B

## 2020-12-07 PROCEDURE — 82533 TOTAL CORTISOL: CPT

## 2020-12-07 PROCEDURE — 36415 COLL VENOUS BLD VENIPUNCTURE: CPT

## 2020-12-07 PROCEDURE — 2700000000 HC OXYGEN THERAPY PER DAY

## 2020-12-07 PROCEDURE — 85018 HEMOGLOBIN: CPT

## 2020-12-07 PROCEDURE — P9016 RBC LEUKOCYTES REDUCED: HCPCS

## 2020-12-07 PROCEDURE — 85027 COMPLETE CBC AUTOMATED: CPT

## 2020-12-07 PROCEDURE — 86923 COMPATIBILITY TEST ELECTRIC: CPT

## 2020-12-07 PROCEDURE — 86900 BLOOD TYPING SEROLOGIC ABO: CPT

## 2020-12-07 RX ORDER — 0.9 % SODIUM CHLORIDE 0.9 %
20 INTRAVENOUS SOLUTION INTRAVENOUS ONCE
Status: DISCONTINUED | OUTPATIENT
Start: 2020-12-07 | End: 2020-12-10 | Stop reason: HOSPADM

## 2020-12-07 RX ORDER — SUCRALFATE 1 G/1
1 TABLET ORAL EVERY 6 HOURS SCHEDULED
Status: DISCONTINUED | OUTPATIENT
Start: 2020-12-07 | End: 2020-12-10 | Stop reason: HOSPADM

## 2020-12-07 RX ORDER — MAGNESIUM SULFATE IN WATER 40 MG/ML
2 INJECTION, SOLUTION INTRAVENOUS ONCE
Status: COMPLETED | OUTPATIENT
Start: 2020-12-07 | End: 2020-12-07

## 2020-12-07 RX ADMIN — FERROUS SULFATE TAB 325 MG (65 MG ELEMENTAL FE) 325 MG: 325 (65 FE) TAB at 08:32

## 2020-12-07 RX ADMIN — FERROUS SULFATE TAB 325 MG (65 MG ELEMENTAL FE) 325 MG: 325 (65 FE) TAB at 22:31

## 2020-12-07 RX ADMIN — GEMFIBROZIL 600 MG: 600 TABLET ORAL at 06:51

## 2020-12-07 RX ADMIN — PANTOPRAZOLE SODIUM 40 MG: 40 TABLET, DELAYED RELEASE ORAL at 15:59

## 2020-12-07 RX ADMIN — DEXAMETHASONE SODIUM PHOSPHATE 2 MG: 4 INJECTION, SOLUTION INTRA-ARTICULAR; INTRALESIONAL; INTRAMUSCULAR; INTRAVENOUS; SOFT TISSUE at 08:32

## 2020-12-07 RX ADMIN — OXYCODONE HYDROCHLORIDE AND ACETAMINOPHEN 500 MG: 500 TABLET ORAL at 21:13

## 2020-12-07 RX ADMIN — DEXAMETHASONE SODIUM PHOSPHATE 2 MG: 4 INJECTION, SOLUTION INTRA-ARTICULAR; INTRALESIONAL; INTRAMUSCULAR; INTRAVENOUS; SOFT TISSUE at 00:46

## 2020-12-07 RX ADMIN — ATORVASTATIN CALCIUM 20 MG: 20 TABLET, FILM COATED ORAL at 08:32

## 2020-12-07 RX ADMIN — MAGNESIUM GLUCONATE 500 MG ORAL TABLET 400 MG: 500 TABLET ORAL at 21:13

## 2020-12-07 RX ADMIN — OXYCODONE HYDROCHLORIDE AND ACETAMINOPHEN 500 MG: 500 TABLET ORAL at 08:32

## 2020-12-07 RX ADMIN — MAGNESIUM SULFATE 2 G: 2 INJECTION INTRAVENOUS at 08:15

## 2020-12-07 RX ADMIN — ATENOLOL 50 MG: 50 TABLET ORAL at 10:24

## 2020-12-07 RX ADMIN — PANTOPRAZOLE SODIUM 40 MG: 40 TABLET, DELAYED RELEASE ORAL at 06:51

## 2020-12-07 RX ADMIN — GLIPIZIDE 5 MG: 5 TABLET ORAL at 06:51

## 2020-12-07 RX ADMIN — GEMFIBROZIL 600 MG: 600 TABLET ORAL at 15:59

## 2020-12-07 ASSESSMENT — PAIN SCALES - GENERAL
PAINLEVEL_OUTOF10: 0
PAINLEVEL_OUTOF10: 0

## 2020-12-07 ASSESSMENT — PAIN DESCRIPTION - PAIN TYPE: TYPE: ACUTE PAIN

## 2020-12-07 NOTE — PROGRESS NOTES
Admit Date: 12/2/2020    Subjective:     Week end event reviewed   Had chest pain last night     Objective:     No data found. I/O last 3 completed shifts: In: 1756.7 [P.O.:540; I.V.:1216.7]  Out: 1500 [Urine:1500]  No intake/output data recorded. HEENT: Normal  NECK: Thyroid normal. No carotid bruit. No lymphphadenopathy. CVS: RRR  RS: Clear. No wheeze. No rhonchi. Good airflow bilaterally. ABD: Soft. Non tender. No mass. Normal BS. EXT: No edema. Non tender. Pulses present.    NEURO: no focal deficit       Scheduled Meds:   pantoprazole  40 mg Oral BID AC    dexamethasone  2 mg Intravenous Q8H    sodium polystyrene  15 g Oral Once    influenza virus vaccine  0.5 mL Intramuscular Prior to discharge    atenolol  50 mg Oral Daily    atorvastatin  20 mg Oral Daily    [Held by provider] clopidogrel  75 mg Oral Daily    ferrous sulfate  325 mg Oral BID WC    gemfibrozil  600 mg Oral BID AC    glipiZIDE  5 mg Oral QAM AC    vitamin C  500 mg Oral BID    vitamin D  50,000 Units Oral Weekly     Continuous Infusions:   IV infusion builder 125 mL/hr at 12/06/20 1158    dextrose Stopped (12/05/20 0856)       CBC with Differential:    Lab Results   Component Value Date    WBC 4.6 12/07/2020    RBC 2.18 12/07/2020    HGB 6.6 12/07/2020    HCT 20.7 12/07/2020     12/07/2020    MCV 95.0 12/07/2020    MCH 30.3 12/07/2020    MCHC 31.9 12/07/2020    RDW 15.2 12/07/2020    LYMPHOPCT 16.5 12/02/2020    MONOPCT 14.0 12/02/2020    BASOPCT 0.7 12/02/2020    MONOSABS 1.07 12/02/2020    LYMPHSABS 1.26 12/02/2020    EOSABS 0.47 12/02/2020    BASOSABS 0.05 12/02/2020     CMP:    Lab Results   Component Value Date     12/07/2020    K 4.7 12/07/2020    K 6.4 12/02/2020     12/07/2020    CO2 24 12/07/2020    BUN 26 12/07/2020    CREATININE 1.5 12/07/2020    GFRAA 54 12/07/2020    LABGLOM 45 12/07/2020    PROT 7.7 12/02/2020    LABALBU 3.9 12/02/2020    CALCIUM 8.0 12/07/2020    BILITOT <0.2 12/02/2020 ALKPHOS 85 12/02/2020    AST 10 12/02/2020    ALT 7 12/02/2020     PT/INR:    Lab Results   Component Value Date    PROTIME 13.1 04/06/2020    INR 1.2 04/06/2020       Assessment:     Principal Problem:    JOHAN (acute kidney injury) ([facility] [address]    Spinal stenosis, lumbar region, without neurogenic claudication    HTN (hypertension)    Type 2 diabetes mellitus (HCC)    Anemia multifactorial     COVID-19 virus infection    COPD (chronic obstructive pulmonary disease) (HCC)    GI bleed      Plan:   Transfuse ,surgical evaluation poss.  Colonoscopy ,Mg+ supplement

## 2020-12-07 NOTE — PROGRESS NOTES
Left message to Dr. Paige Carpenter about patient's chest pain. Received a called back, new orders. Vital signs stable, will continue to monitor.

## 2020-12-07 NOTE — PROGRESS NOTES
Pulmonary [facility]                               Division of Pulmonary, Critical Care            Pulmonary Progress Note           CC : GI bleed  Following for: Lung mass    SUBJECTIVE:  Patient was seen and examined at bedside in AM. Patient is awake and alert but seems lethargic. He states his breathing is okay. He denies having any fever, chills, cough, sputum production, or SOB at this time.     hgb dropped to 6.6 this AM. 1 unit of pRBC ordered per primary for transfusion. OBJECTIVE:  Vitals:    12/07/20 0815   BP: 133/64   Pulse: 63   Resp: 17   Temp: 97.7 °F (36.5 °C)   SpO2: 100%     Constitutional: Alert, cooperative. EENT: EOMI FREDRICK. MMM. No icterus. No thrush. Neck: No thyromegaly. No elevated JVP. Trachea was midline. Respiratory: Symmetrical.  BS were clear. Cardiovascular: Regular, No murmur. No clicks, gallops or rubs. Pulses:  Equal bilaterally. Abdomen: Soft without organomegaly. No rebound, rigidity. No guarding. Lymphatic: No lymphadenopathy. Musculoskeletal: Without weakness  Extremities:  No lower extremity edema. Reflexes appear adequate. Skin:  Warm and dry. No skin rashes. Neurological/Psychiatric: No acute psychosis. Cranial nerves II-XII are intact. DATA:    CT Chest wo contrast: 12/2/2020                                  CLINICAL ASSESMENT:  1.) Lung Masscancer most likely    2. )COPD   3. )COVID Pneumonia  4. )Hypoxia   5.)Gi Bleed    PLAN:   *-discontinue decadron as patient's hgb has dropped and he will require transfusion  *-1 unit pRBC transfusion   *-pantoprazole twice daily and carafate  *-Etiology for his lung mass, at this time I will let him recover from Covid, so most likely we will do it as outpatient in couple weeks  And we will do bronchoscopy with EBUS for station 7 for diagnosis and for staging or we can biopsy his adrenal mass for CT-guided and that we get also staging and diagnosis     In

## 2020-12-07 NOTE — CONSULTS
GENERAL SURGERY  CONSULT NOTE  12/7/2020    Physician Consulted: Dr. Fransisca Thomas  Reason for Consult: GI bleed  Referring Physician: Dr. Torri Reyez    CC: rectal bleeding, anemia    YOVANI Rosa is a [de-identified] y.o. male who presents for evaluation of possible GI bleed. Patient's hemoglobin on presentation was between 8 and 9. Hemoglobin yesterday was 8.1 however, hemoglobin today was 6.6. Patient's had questionable melenic stools, he takes iron supplements which may also give false positives for Hemoccult testing. He has had an EGD and a colonoscopy in 2017 and 2018 both showing gastritis as well as sigmoid diverticular disease/AV malformations respectfully. He has had recent hospital stays for the same issue. At this point time he does not seem confused. He denies any fever/chills, nausea/vomiting, and/or any other constitutional changes at this time. His hospital stay has been prolonged due to suspicious looking pulmonary nodules, concern for lung cancer. Past Medical History:   Diagnosis Date    Chronic back pain     Hyperlipidemia     Hypertension     Osteoarthritis     Type II or unspecified type diabetes mellitus without mention of complication, not stated as uncontrolled        Past Surgical History:   Procedure Laterality Date    CAROTID ENDARTERECTOMY  2008    COLONOSCOPY      COLONOSCOPY N/A 4/16/2018    COLONOSCOPY WITH BIOPSY performed by Thea Hdez MD at [address] COLONOSCOPY  4/16/2018    COLONOSCOPY CONTROL HEMORRHAGE performed by Thea Hdez MD at [address] [place] [address], [facility]  02/05/2017    [address]  2002    by Dr Yenny Mckeon N/A 4/14/2018    EGD BIOPSY performed by Salvatore Koroma DO at [facility] ENDOSCOPY       Medications Prior to Admission:    Prior to Admission medications    Medication Sig Start Date End Date Taking?  Authorizing Provider   gemfibrozil (LOPID) 600 MG tablet Take 600 mg by mouth 2 times daily Yes Historical Provider, MD   metFORMIN (GLUCOPHAGE) 850 MG tablet Take 850 mg by mouth 2 times daily   Yes Historical Provider, MD   pantoprazole (PROTONIX) 40 MG tablet Take 40 mg by mouth daily   Yes Historical Provider, MD   Acetaminophen (APAP) 325 MG TABS Take 650 mg by mouth every 6 hours as needed for Pain or Fever   Yes Historical Provider, MD   camphor-menthol (SARNA) 0.5-0.5 % lotion Apply 1 Bottle topically every 6 hours as needed for Itching To the Chest   Yes Historical Provider, MD   vitamin D (ERGOCALCIFEROL) 1.25 MG (21169 UT) CAPS capsule Take 50,000 Units by mouth once a week Sundays   Yes Historical Provider, MD   ferrous sulfate (IRON 325) 325 (65 Fe) MG tablet Take 325 mg by mouth 2 times daily   Yes Historical Provider, MD   aluminum & magnesium hydroxide-simethicone (MAALOX) 200-200-20 MG/5ML SUSP suspension Take 30 mLs by mouth as needed (Epigastric Pain)   Yes Historical Provider, MD   nitroGLYCERIN (NITROSTAT) 0.4 MG SL tablet Place 0.4 mg under the tongue every 5 minutes as needed for Chest pain up to max of 3 total doses. Yes Historical Provider, MD   vitamin C (ASCORBIC ACID) 500 MG tablet Take 500 mg by mouth 2 times daily   Yes Historical Provider, MD   glipiZIDE (GLUCOTROL) 5 MG tablet Take 1 tablet by mouth every morning (before breakfast) 3/20/20  Yes Windy Edwards MD   atorvastatin (LIPITOR) 20 MG tablet Take 1 tablet by mouth daily 3/19/20  Yes Windy Edwards MD   clopidogrel (PLAVIX) 75 MG tablet Take 75 mg by mouth daily   Yes Historical Provider, MD   atenolol (TENORMIN) 50 MG tablet Take 50 mg by mouth daily. Yes Historical Provider, MD   lisinopril (PRINIVIL;ZESTRIL) 40 MG tablet Take 40 mg by mouth daily. Yes Historical Provider, MD       Allergies   Allergen Reactions    Sulfa Antibiotics        History reviewed. No pertinent family history.     Social History     Tobacco Use    Smoking status: Current Every Day Smoker     Packs/day: 2.00     Types: Cigarettes  Smokeless tobacco: Never Used   Substance Use Topics    Alcohol use: No    Drug use: No         Review of Systems   Chart was reviewed please see HPI for all relevant review of systems      PHYSICAL EXAM:    Vitals:    12/07/20 0815   BP: 133/64   Pulse: 63   Resp: 17   Temp: 97.7 °F (36.5 °C)   SpO2: 100%       General Appearance:  awake, oriented, in no acute distress  Skin:  Skin color, texture, turgor normal. No rashes or lesions. Head/face:  NCAT  Lungs:  No chest wall tenderness. Heart:  Heart regular rate and rhythm  Abdomen:  Soft, non-tender, nondistended  Extremities: pulses present in all extremities  Male Rectal: No bright red blood per rectum stool was black however patient consumes iron supplements      LABS:    CBC  Recent Labs     12/07/20  0846   WBC 4.4*   HGB 6.6*   HCT 20.8*        BMP  Recent Labs     12/07/20  0400      K 4.7      CO2 24   BUN 26*   CREATININE 1.5*   CALCIUM 8.0*     Liver Function  No results for input(s): AMYLASE, LIPASE, BILITOT, BILIDIR, AST, ALT, ALKPHOS, PROT, LABALBU in the last 72 hours. No results for input(s): LACTATE in the last 72 hours. No results for input(s): INR, PTT in the last 72 hours. Invalid input(s): PT    RADIOLOGY    Ct Abdomen Pelvis Wo Contrast    Result Date: 12/2/2020  EXAMINATION: CT OF THE ABDOMEN AND PELVIS WITHOUT CONTRAST 12/2/2020 3:00 pm TECHNIQUE: CT of the abdomen and pelvis was performed without the administration of intravenous contrast. Multiplanar reformatted images are provided for review. Dose modulation, iterative reconstruction, and/or weight based adjustment of the mA/kV was utilized to reduce the radiation dose to as low as reasonably achievable. COMPARISON: CT of the abdomen and pelvis, 03/18/2020 HISTORY: ORDERING SYSTEM PROVIDED HISTORY: pain TECHNOLOGIST PROVIDED HISTORY: Reason for exam:->pain What reading provider will be dictating this exam?->CRC FINDINGS: Organs: Liver: Unremarkable. Gallbladder: Cholelithiasis noted in the region of the gallbladder neck. No pericholecystic edema. Pancreas: Unremarkable. Spleen:  Unremarkable. Adrenals: Compared to the previous CT from 03/18/2020, there is interval development of a large mass in the right adrenal gland consistent with metastatic disease. It measures approximately 4.9 x 3.6 cm. There is probable 1.7 cm metastatic lesion in the body of the left adrenal gland. Additional hypodense foci are stable and likely represent adenomas. Kidneys: Innumerable renal cysts are seen. No stone or hydronephrosis. GI/Bowel: Liquid stool in the colon indicates a diarrheal illness. No bowel wall thickening or obstruction noted. Prominent distal colonic diverticulosis without diverticulitis. Normal appendix. Pelvis: Urinary bladder is decompressed by a Adan catheter but is otherwise grossly unremarkable. The prostate gland is enlarged, approximately measuring 4.0 x 4.8 x 3.9 cm. Peritoneum/Retroperitoneum: Interval development of an enlarged, 2.5 x 2.1 cm retrocaval lymph node, likely metastatic (series 7, image 56). No free air or free fluid. Moderate calcified atherosclerosis is seen in the aorta. No aneurysm. Bones/Soft Tissues: No fracture is seen. No bony destructive lesion. Decompressive laminectomies with posterior body fusion hardware seen at L4-5. Severe disc degenerative changes seen in the lower thoracic and lumbar spine. 1. Evidence of metastatic disease in the abdomen and pelvis which has developed in the interim since 03/18/2020. 2. Large right adrenal mass measuring 4.9 x 3.6 cm, likely metastatic. 3. 1.7 cm left adrenal mass likely metastatic. Multiple left adrenal adenomas. 4. Solitary enlarged retrocaval lymph node, likely metastatic. 5.  Liquid stool in the colon indicates a diarrheal illness. No bowel wall thickening or obstruction noted.     Ct Chest Wo Contrast    Result Date: 12/2/2020  EXAMINATION: CT OF THE CHEST WITHOUT CONTRAST 12/2/2020 3:00 pm TECHNIQUE: CT of the chest was performed without the administration of intravenous contrast. Multiplanar reformatted images are provided for review. Dose modulation, iterative reconstruction, and/or weight based adjustment of the mA/kV was utilized to reduce the radiation dose to as low as reasonably achievable. COMPARISON: April 6, 2020 HISTORY: ORDERING SYSTEM PROVIDED HISTORY: trauma TECHNOLOGIST PROVIDED HISTORY: Reason for exam:->trauma What reading provider will be dictating this exam?->CRC FINDINGS: Mediastinum: Interval development/progression of extensive mediastinal and bilateral hilar adenopathy. These measure up to 4.8 x 3.1 cm in the subcarinal space, 5.7 x 4.4 cm in the left hilum, and 2.4 by 2.2 cm in the superior mediastinum. Lungs/pleura: Stable reticular markings and interstitial fibrosis predominantly involving the peripheral lung zones noted. No airspace consolidation or pneumothorax. There is an enlarging 3.2 x 2.0 cm left lower lobe mass abutting against the pleural a, previously measured 2.2 x 1.3 cm. Previously noted small left pleural effusion has resolved. Upper Abdomen: Interval development of 5.4 x 3.6 cm right suprarenal mass. The left adrenal gland remains enlarged. There are small calcified gallstones. Multiple cystic changes noted in the visualized portion of the kidneys. Soft Tissues/Bones: No lytic or blastic bony lesions. Degenerative changes in the spine. Enlarging pleural based left lower lobe mass and interval development of extensive mediastinal and bilateral hilar adenopathy, right suprarenal/adrenal mass. These are most compatible with metastatic process. Peripheral interstitial fibrosis of the lungs. The findings were sent to the Radiology Results [address] at 3:37 pm on 12/2/2020to be communicated to a licensed caregiver.         ASSESSMENT:  [de-identified] y.o. male who presented on 12/05 for melenic stools, found to have COVID-19, acute respiratory distress, JOHAN as well as suspicious liver masses suspected to be metastatic lung disease. PLAN:  Overall care per primary  Transfuse per primary  We will attempt nonoperative management at this point in time  PPI twice daily  Hold anticoagulation  Peptic ulcer diet  Continue to monitor hemoglobin    Electronically signed by Hari Bolden DO on 12/7/20 at 11:36 AM EST    I saw and examined the patient. I reviewed the above resident's note. I agree with the assessment and plan as outlined. [facility] Plan as above.     Gisell Montana MD  General Surgery

## 2020-12-07 NOTE — PLAN OF CARE
Problem: Airway Clearance - Ineffective  Goal: Achieve or maintain patent airway  Outcome: Met This Shift     Problem: Breathing Pattern - Ineffective  Goal: Ability to achieve and maintain a regular respiratory rate  Outcome: Met This Shift     Problem:  Body Temperature -  Risk of, Imbalanced  Goal: Ability to maintain a body temperature within defined limits  12/7/2020 0510 by Ehsan Victor RN  Outcome: Met This Shift     Problem: Risk for Fluid Volume Deficit  Goal: Maintain normal heart rhythm  12/7/2020 0510 by Ehsan Victor RN  Outcome: Met This Shift

## 2020-12-07 NOTE — PROGRESS NOTES
Department of Internal Medicine  Nephrology Attending Progress Note      Events Reviewed. Subjective: We are following Mr. Kam Ballard for elevated creatinine, hyperkalemia and metabolic acidosis. Reports no complaints. Receiving PRBC for drop in hemoglobin. Today 6.6mg/dL.     Current Medications:    Current Facility-Administered Medications: 0.9 % sodium chloride bolus, 20 mL, Intravenous, Once  pantoprazole (PROTONIX) tablet 40 mg, 40 mg, Oral, BID AC  dexamethasone (DECADRON) injection 2 mg, 2 mg, Intravenous, Q8H  sodium polystyrene (KAYEXALATE) 15 GM/60ML suspension 15 g, 15 g, Oral, Once  glucose (GLUTOSE) 40 % oral gel 15 g, 15 g, Oral, PRN  dextrose 50 % IV solution, 12.5 g, Intravenous, PRN  glucagon (rDNA) injection 1 mg, 1 mg, Intramuscular, PRN  dextrose 5 % solution, 100 mL/hr, Intravenous, PRN  influenza A&B vaccine (FLUAD QUADRIVALENT) injection 0.5 mL, 0.5 mL, Intramuscular, Prior to discharge  acetaminophen (TYLENOL) tablet 650 mg, 650 mg, Oral, Q6H PRN  aluminum & magnesium hydroxide-simethicone (MAALOX) 200-200-20 MG/5ML suspension 30 mL, 30 mL, Oral, Daily PRN  atenolol (TENORMIN) tablet 50 mg, 50 mg, Oral, Daily  atorvastatin (LIPITOR) tablet 20 mg, 20 mg, Oral, Daily  [Held by provider] clopidogrel (PLAVIX) tablet 75 mg, 75 mg, Oral, Daily  ferrous sulfate (IRON 325) tablet 325 mg, 325 mg, Oral, BID WC  gemfibrozil (LOPID) tablet 600 mg, 600 mg, Oral, BID AC  glipiZIDE (GLUCOTROL) tablet 5 mg, 5 mg, Oral, QAM AC  nitroGLYCERIN (NITROSTAT) SL tablet 0.4 mg, 0.4 mg, Sublingual, Q5 Min PRN  vitamin C (ASCORBIC ACID) tablet 500 mg, 500 mg, Oral, BID  vitamin D (ERGOCALCIFEROL) capsule 50,000 Units, 50,000 Units, Oral, Weekly  Allergies:  Sulfa antibiotics      PHYSICAL EXAM:      Vitals:    VITALS:  /64   Pulse 63   Temp 97.7 °F (36.5 °C) (Axillary)   Resp 17   SpO2 100%   24HR INTAKE/OUTPUT:      Intake/Output Summary (Last 24 hours) at 12/7/2020 1200  Last data filed at 12/7/2020 1131  Gross per 24 hour   Intake 1576.67 ml   Output 2000 ml   Net -423.33 ml       Constitutional: Patient is awake alert in no distress  HEENT: Pupils are equal reactive  Respiratory: Lungs are clear  Cardiovascular/Edema: Heart sounds RRR  Gastrointestinal: Abdomen soft  Neurologic: Nonfocal  Skin: No lesions  Other: No edema    DATA:    CBC:   Lab Results   Component Value Date    WBC 4.4 12/07/2020    RBC 2.21 12/07/2020    HGB 6.6 12/07/2020    HCT 20.8 12/07/2020    MCV 94.1 12/07/2020    MCH 29.9 12/07/2020    MCHC 31.7 12/07/2020    RDW 15.2 12/07/2020     12/07/2020    MPV 9.5 12/07/2020     CMP:    Lab Results   Component Value Date     12/07/2020    K 4.7 12/07/2020    K 6.4 12/02/2020     12/07/2020    CO2 24 12/07/2020    BUN 26 12/07/2020    CREATININE 1.5 12/07/2020    GFRAA 54 12/07/2020    LABGLOM 45 12/07/2020    GLUCOSE 166 12/07/2020    PROT 7.7 12/02/2020    LABALBU 3.9 12/02/2020    CALCIUM 8.0 12/07/2020    BILITOT <0.2 12/02/2020    ALKPHOS 85 12/02/2020    AST 10 12/02/2020    ALT 7 12/02/2020     Magnesium:    Lab Results   Component Value Date    MG 1.1 12/07/2020     Phosphorus:    Lab Results   Component Value Date    PHOS 2.4 12/07/2020          Radiology Review:      CT chest without IV contrast December 2, 2020   Enlarging pleural based left lower lobe mass and interval development of    extensive mediastinal and bilateral hilar adenopathy, right    suprarenal/adrenal mass. Kaylah Filter are most compatible with metastatic process. Peripheral interstitial fibrosis of the lungs. The findings were sent to the Radiology Results [address] at 3:37    pm on 12/2/2020to be communicated to a licensed caregiver. CT abdomen and pelvis without IV contrast December 2, 2020   1. Evidence of metastatic disease in the abdomen and pelvis which has    developed in the interim since 03/18/2020.    2. Large right adrenal mass measuring 4.9 x 3.6 cm, likely metastatic. 3. 1.7 cm left adrenal mass likely metastatic.  Multiple left adrenal    adenomas. 4. Solitary enlarged retrocaval lymph node, likely metastatic. 5.  Liquid stool in the colon indicates a diarrheal illness.  No bowel wall    thickening or obstruction noted. Problems Resolved:  Hyperkalemia, multifactorial, secondary to #1, ACE inhibition and possibly adrenal insufficiency     IMPRESSION/RECOMMENDATIONS:      Briefly Mr. Altagracia Fields is a 78-year-old man with history of CKD stage 3, baseline creatinine 1.8-2 mg/dl, HTN, type II DM, CVA, dementia, recently diagnosed with COVID-19, who was [facility] [address] for [facility] of GI bleeding. In the ER she was found to have a CT scan of the chest which show extensive mediastinal and hilar lymphadenopathy as well as a mass in the left lower lobe lung and multiple lesions of the abdomen consistent with metastatic disease. On admission case creatinine level of 3.1 mg/dL, potassium level 6.4 mEq/L and bicarbonate level of 15 mEq/L; with IV fluid resuscitation his renal function has improved but he continues to have persistent hyperkalemia metabolic acidosis, resolved for this consultation. His medications prior to admission including Metformin, lisinopril. 1. JOHAN on CKD, volume responsive prerenal JOHAN, multifactorial including poor intake, GI bleed in setting of ACE inhibition. Renal function has improved with IV fluids  2. CKD stage III, probably due to underlying nephrosclerosis versus diabetic kidney disease, baseline creatinine 1.8-2 mg/dL  3. Low bicarbonate levels with hyperkalemia, likely non-anion gap metabolic acidosis, 2/2 CKD with decrease ammoniogenesis, ACE inhibition, diarrhea?,  adrenal insufficiency? 4. Metastatic cancer, probably primary lung CA  5. HTN, on atenolol  6. Rule out adrenal insufficiency, 2/2  metastatic disease, presently on dexamethasone  7.  Anemia, rule out GI bleeding, on pantoprazole, S/p transfusion  8. COVID-19 pneumonia, on dexamethasone  9. Type II DM, on glipizide, Metformin  10.  Hyperlipidemia, on atorvastatin, gemfibrozil    Plan:    · Magnesium sulphate 2 gm IV once  · Start Magnesium oxide 400 mg PO BID x 4 doses  · K is better  · 1 unit of PRBC transfusion today  · Continue to monitor renal function and UO closely

## 2020-12-07 NOTE — PLAN OF CARE
Problem: Airway Clearance - Ineffective  Goal: Achieve or maintain patent airway  Outcome: Met This Shift     Problem: Gas Exchange - Impaired  Goal: Absence of hypoxia  Outcome: Met This Shift     Problem:  Body Temperature -  Risk of, Imbalanced  Goal: Ability to maintain a body temperature within defined limits  Outcome: Met This Shift     Problem: Risk for Fluid Volume Deficit  Goal: Maintain normal heart rhythm  Outcome: Met This Shift

## 2020-12-08 LAB
ANION GAP SERPL CALCULATED.3IONS-SCNC: 8 MMOL/L (ref 7–16)
BUN BLDV-MCNC: 23 MG/DL (ref 8–23)
CALCIUM SERPL-MCNC: 8.3 MG/DL (ref 8.6–10.2)
CHLORIDE BLD-SCNC: 102 MMOL/L (ref 98–107)
CO2: 27 MMOL/L (ref 22–29)
CORTISOL TOTAL: 6.87 MCG/DL (ref 2.68–18.4)
CREAT SERPL-MCNC: 1.6 MG/DL (ref 0.7–1.2)
FERRITIN: 180 NG/ML
GFR AFRICAN AMERICAN: 51
GFR NON-AFRICAN AMERICAN: 42 ML/MIN/1.73
GLUCOSE BLD-MCNC: 66 MG/DL (ref 74–99)
HCT VFR BLD CALC: 24.9 % (ref 37–54)
HEMOGLOBIN: 8.3 G/DL (ref 12.5–16.5)
MCH RBC QN AUTO: 30.6 PG (ref 26–35)
MCHC RBC AUTO-ENTMCNC: 33.3 % (ref 32–34.5)
MCV RBC AUTO: 91.9 FL (ref 80–99.9)
METER GLUCOSE: 83 MG/DL (ref 74–99)
PDW BLD-RTO: 15.4 FL (ref 11.5–15)
PLATELET # BLD: 213 E9/L (ref 130–450)
PMV BLD AUTO: 9 FL (ref 7–12)
POTASSIUM SERPL-SCNC: 4.2 MMOL/L (ref 3.5–5)
RBC # BLD: 2.71 E12/L (ref 3.8–5.8)
SODIUM BLD-SCNC: 137 MMOL/L (ref 132–146)
WBC # BLD: 5.6 E9/L (ref 4.5–11.5)

## 2020-12-08 PROCEDURE — 97530 THERAPEUTIC ACTIVITIES: CPT

## 2020-12-08 PROCEDURE — 85027 COMPLETE CBC AUTOMATED: CPT

## 2020-12-08 PROCEDURE — 6370000000 HC RX 637 (ALT 250 FOR IP): Performed by: STUDENT IN AN ORGANIZED HEALTH CARE EDUCATION/TRAINING PROGRAM

## 2020-12-08 PROCEDURE — 6370000000 HC RX 637 (ALT 250 FOR IP): Performed by: INTERNAL MEDICINE

## 2020-12-08 PROCEDURE — 99222 1ST HOSP IP/OBS MODERATE 55: CPT | Performed by: SURGERY

## 2020-12-08 PROCEDURE — 97162 PT EVAL MOD COMPLEX 30 MIN: CPT

## 2020-12-08 PROCEDURE — 82533 TOTAL CORTISOL: CPT

## 2020-12-08 PROCEDURE — 36415 COLL VENOUS BLD VENIPUNCTURE: CPT

## 2020-12-08 PROCEDURE — 97166 OT EVAL MOD COMPLEX 45 MIN: CPT

## 2020-12-08 PROCEDURE — 82962 GLUCOSE BLOOD TEST: CPT

## 2020-12-08 PROCEDURE — 80048 BASIC METABOLIC PNL TOTAL CA: CPT

## 2020-12-08 PROCEDURE — 2700000000 HC OXYGEN THERAPY PER DAY

## 2020-12-08 PROCEDURE — 97535 SELF CARE MNGMENT TRAINING: CPT

## 2020-12-08 PROCEDURE — 2060000000 HC ICU INTERMEDIATE R&B

## 2020-12-08 RX ADMIN — SUCRALFATE 1 G: 1 TABLET ORAL at 00:11

## 2020-12-08 RX ADMIN — SUCRALFATE 1 G: 1 TABLET ORAL at 16:36

## 2020-12-08 RX ADMIN — PANTOPRAZOLE SODIUM 40 MG: 40 TABLET, DELAYED RELEASE ORAL at 16:36

## 2020-12-08 RX ADMIN — ACETAMINOPHEN 650 MG: 325 TABLET ORAL at 18:15

## 2020-12-08 RX ADMIN — OXYCODONE HYDROCHLORIDE AND ACETAMINOPHEN 500 MG: 500 TABLET ORAL at 21:55

## 2020-12-08 RX ADMIN — GEMFIBROZIL 600 MG: 600 TABLET ORAL at 06:44

## 2020-12-08 RX ADMIN — PANTOPRAZOLE SODIUM 40 MG: 40 TABLET, DELAYED RELEASE ORAL at 06:44

## 2020-12-08 RX ADMIN — MAGNESIUM GLUCONATE 500 MG ORAL TABLET 400 MG: 500 TABLET ORAL at 21:55

## 2020-12-08 RX ADMIN — SUCRALFATE 1 G: 1 TABLET ORAL at 12:55

## 2020-12-08 RX ADMIN — ATORVASTATIN CALCIUM 20 MG: 20 TABLET, FILM COATED ORAL at 08:41

## 2020-12-08 RX ADMIN — GLIPIZIDE 5 MG: 5 TABLET ORAL at 08:39

## 2020-12-08 RX ADMIN — FERROUS SULFATE TAB 325 MG (65 MG ELEMENTAL FE) 325 MG: 325 (65 FE) TAB at 16:36

## 2020-12-08 RX ADMIN — FERROUS SULFATE TAB 325 MG (65 MG ELEMENTAL FE) 325 MG: 325 (65 FE) TAB at 08:40

## 2020-12-08 RX ADMIN — SUCRALFATE 1 G: 1 TABLET ORAL at 06:44

## 2020-12-08 RX ADMIN — OXYCODONE HYDROCHLORIDE AND ACETAMINOPHEN 500 MG: 500 TABLET ORAL at 08:40

## 2020-12-08 RX ADMIN — ATENOLOL 50 MG: 50 TABLET ORAL at 08:40

## 2020-12-08 RX ADMIN — MAGNESIUM GLUCONATE 500 MG ORAL TABLET 400 MG: 500 TABLET ORAL at 08:40

## 2020-12-08 RX ADMIN — GEMFIBROZIL 600 MG: 600 TABLET ORAL at 16:36

## 2020-12-08 RX ADMIN — ACETAMINOPHEN 650 MG: 325 TABLET ORAL at 21:58

## 2020-12-08 ASSESSMENT — PAIN SCALES - GENERAL
PAINLEVEL_OUTOF10: 7
PAINLEVEL_OUTOF10: 0

## 2020-12-08 NOTE — PROGRESS NOTES
Physical Therapy  Physical Therapy Initial Assessment   Name: Rachna Gr  : 1940  MRN: [id number]    Referring Provider:  Magnus Nelson MD    Date of Service: 2020    Evaluating PT:  Jorge Collazo, PT, DPT [id number]    Room #:  3092/1348-H  Diagnosis:  JOHAN  PMHx/PSHx:  DM 2, OA, HTN, HLD, chronic back pain  Precautions:  Falls, DROPLET PLUS ISOLATION, COVID +, dementia  Equipment Needs:  TBD    SUBJECTIVE:  Pt is questionable historian. He reports he lives at home with family, but per medical chart is from SNF. OBJECTIVE:   Initial Evaluation  Date: 2020 Treatment Short Term/ Long Term   Goals   AM-PAC 6 Clicks /17     Was pt agreeable to Eval/treatment? yes     Does pt have pain? Back pain 7/10     Bed Mobility  Rolling: SBA  Supine to sit: Mala  Sit to supine: Mala  Scooting: Mala  Rolling: Independent  Supine to sit: Independent  Sit to supine: Independent  Scooting: Independent   Transfers Sit to stand: Mala  Stand to sit: Mala  Stand pivot: Mala front [address]  Sit to stand: Independent  Stand to sit: Independent  Stand pivot: Des front [address]   Ambulation    25 feet with front [address] modA. Multiple LOBs  150 feet with front [address]  Des   Stair negotiation: ascended and descended  NT  NA   ROM BUE:  Per OT note  BLE:  WNL     Strength BUE:  Per OT note  BLE:  WNL     Balance Sitting EOB:  SBA  Dynamic Standing:  modA front [address]  Sitting EOB:  Independent  Dynamic Standing:  Des front [address]     Pt is A & O x 3 (self, place, year not month)  Sensation:  Pt denies numbness and tingling to extremities  Edema:  unremarkable    Vitals:  SPo2 monitored throughout session on 2.5L NC  Rest 96%  Ambulation 86%  Seated post ambulation 93% after 2 minutes. Patient education  Pt educated on role of PT intervention. Pt educated on safety in room with utilization of call light for assistance with mobility.   Pt educated on importance of maximizing OOB time by transferring to bedside chair for meals and ambulating to bathroom/transferring to bedside commode with assistance from nursing and therapy staff to increase functional activity tolerance and overall functional independence. Patient response to education:   Pt verbalized understanding Pt demonstrated skill Pt requires further education in this area   yes yes yes     ASSESSMENT:    Comments:  Additional time was required for donning/doffing of all necessary PPE for droplet plus isolation for COVID-19 as well as proper sanitization of all equipment utilized during session. RN cleared pt for activity prior to session. Pt received supine in bed and agreeable to PT intervention at this time. Pt performed all functional mobility as noted above. Pt confused at baseline and is questionable historian. During ambulation pt was unsteady and had seemingly ataxic BLE when advancing and placing foot. Pt became SOB and reported worsening back pain with ambulation so he was returned to supine and left with all needs met and call light in reach. Pt requires continued skilled PT intervention for the purposes of maximizing functional mobility and independence. Treatment:  Patient practiced and was instructed in the following treatment:     Therapeutic Activities Completed:  o Functional mobility as noted above:   - Bed mobility: SBA/Mala. Pt reliant on bed rail to complete. Pt required max VC for efficient technique as he demonstrated poor sequencing and performing task in an excessively difficult manner.    - Transfer training: sit<>stand Mala and stand pivot transfer with [facility] Mala. Max VC for proper sequencing with [facility] when turning to sit as well as hand placement. - Ambulation: 25 feet with [facility] modA x 2 reps. Pt had multiple LOBs.   Pt's BLE appear ataxic as he has inconsistent placement of BLE when stepping.    o Skilled repositioning in supine with HOB elevated for comfort.  o Pt education as noted above.  o Skilled vitals monitoring as noted above. Pt's/ family goals   1. Return home. Patient and or family understand(s) diagnosis, prognosis, and plan of care. yes    PLAN OF CARE:    Current Treatment Recommendations     [x] Strengthening     [x] ROM   [x] Balance Training   [x] Endurance Training   [x] Transfer Training   [x] Gait Training   [x] Stair Training   [x] Positioning   [x] Safety and Education Training   [x] Patient/Caregiver Education   [] HEP  [] Other     PT care will be provided in accordance with the objectives noted above. The above treatment recommendations will be utilized to address deficits described above in order to restore pt's prior level of function and/or achieve modified functional independence with adaptive strategies. Frequency of treatments: 2-5x/week x 1-2 weeks. Time in  1341  Time out  1400    Total Treatment Time  15 minutes     Evaluation Time includes thorough review of current medical information, gathering information on past medical history/social history and prior level of function, completion of standardized testing/informal observation of tasks, assessment of data and education on plan of care and goals.     CPT codes:  [] Low Complexity PT evaluation 11442  [x] Moderate Complexity PT evaluation 90336  [] High Complexity PT evaluation 81874  [] PT Re-evaluation 69538  [] Gait training 44240 0 minutes  [] Manual therapy 41214 0 minutes  [x] Therapeutic activities 73156 15 minutes  [] Therapeutic exercises 75201 0 minutes  [] Neuromuscular reeducation 24848 0 minutes     Anita Liu, PT, DPT  [id number]

## 2020-12-08 NOTE — PLAN OF CARE
Problem: Airway Clearance - Ineffective  Goal: Achieve or maintain patent airway  12/8/2020 0347 by Sayda Pond RN  Outcome: Met This Shift  12/7/2020 2359 by Jessie Powell  Outcome: Met This Shift     Problem: Gas Exchange - Impaired  Goal: Absence of hypoxia  12/8/2020 0347 by Sayda Pond RN  Outcome: Met This Shift  12/7/2020 2359 by Jessie Powell  Outcome: Met This Shift     Problem: Breathing Pattern - Ineffective  Goal: Ability to achieve and maintain a regular respiratory rate  12/8/2020 0347 by Sayda Pond RN  Outcome: Met This Shift  12/7/2020 2359 by Jessie Powell  Outcome: Met This Shift     Problem:  Body Temperature -  Risk of, Imbalanced  Goal: Ability to maintain a body temperature within defined limits  Outcome: Met This Shift  Goal: Complications related to the disease process, condition or treatment will be avoided or minimized  Outcome: Met This Shift     Problem: Isolation Precautions - Risk of Spread of Infection  Goal: Prevent transmission of infection  Outcome: Met This Shift     Problem: Nutrition Deficits  Goal: Optimize nutrtional status  Outcome: Met This Shift     Problem: Risk for Fluid Volume Deficit  Goal: Maintain normal heart rhythm  Outcome: Met This Shift  Goal: Maintain absence of muscle cramping  Outcome: Met This Shift  Goal: Maintain normal serum potassium, sodium, calcium, phosphorus, and pH  Outcome: Met This Shift     Problem: Loneliness or Risk for Loneliness  Goal: Demonstrate positive use of time alone when socialization is not possible  Outcome: Met This Shift     Problem: Fatigue  Goal: Verbalize increase energy and improved vitality  Outcome: Met This Shift     Problem: Patient Education: Go to Patient Education Activity  Goal: Patient/Family Education  Outcome: Met This Shift     Problem: Skin Integrity:  Goal: Will show no infection signs and symptoms  Description: Will show no infection signs and symptoms  Outcome: Met This Shift  Goal: Absence of new skin breakdown  Description: Absence of new skin breakdown  Outcome: Met This Shift     Problem: Falls - Risk of:  Goal: Will remain free from falls  Description: Will remain free from falls  Outcome: Met This Shift  Goal: Absence of physical injury  Description: Absence of physical injury  Outcome: Met This Shift     Problem: Pain:  Goal: Pain level will decrease  Description: Pain level will decrease  Outcome: Met This Shift  Goal: Control of acute pain  Description: Control of acute pain  Outcome: Met This Shift  Goal: Control of chronic pain  Description: Control of chronic pain  Outcome: Met This Shift

## 2020-12-08 NOTE — PROGRESS NOTES
Occupational Therapy  OCCUPATIONAL THERAPY INITIAL EVALUATION      Date:2020  Patient Name: Jumana Davis  MRN: [id number]  : 1940  Room: [address] [place] [address]    Referring Provider:  Mary Montano MD    Evaluating OT: Haven Journey OTR/L #[id number]     AM-PAC Daily Activity Raw Score: 15/24    Recommended Adaptive Equipment:  TBD     Diagnosis: JOHAN   Patient presented to the hospital for GI bleed. Pt required transfusion for low hgb. Pertinent Medical History:    Past Medical History:   Diagnosis Date    Chronic back pain     Hyperlipidemia     Hypertension     Osteoarthritis     Type II or unspecified type diabetes mellitus without mention of complication, not stated as uncontrolled       Precautions:  Falls, Droplet plus (COVID-19),  O2, bed alarm     Home Living: Pt questionable historian: pt reports he lives alone but son check in on him frequently. 2 story home with 3 RODOLFO. Reports bed on 1st floor; shower on 2nd floor  Bathroom setup: walk-in shower   Equipment owned: w/w      Prior Level of Function: Pt questionable historian: reports mod I with ADLs , ? with IADLs; ambulated without AD  Driving: no  Occupation: retired      Pain Level: Pt denies pain this session    Cognition: A&O: grossly 3/4 (self, place, year; month with cues);  Follows 1 step directions   Memory:  P+   Sequencing:  P+   Problem solving:  P+   Judgement/safety:  P+   Impaired insight     Functional Assessment:   Initial Eval Status  Date: 20 Treatment Status  Date: Short Term Goals = Long Term Goals  Treatment frequency: 1-4x/wk; PRN   Feeding Set-up   indep    Grooming Minimal Assist     Seated at EOB  Supervision    UB Dressing Minimal Assist   Supervision    LB Dressing Maximal Assist   Minimal Assist    Bathing Maximal Assist  Minimal Assist    Toileting Moderate Assist   Minimal Assist    Bed Mobility  Supine to sit: Minimal Assist   Sit to supine: Minimal Assist   Supine to sit: Supervision   Sit to supine: Supervision    Functional Transfers Minimal Assist   Supervision    Functional Mobility Moderate Assist     Ambulated short distances in room with w/w to/from BS - + unsteadiness; cuing on w/w management, posture and safety   Supervision    Balance Sitting:     Static:  SBA    Dynamic:min A  Standing: min / mod A at w/w      Activity Tolerance F-    Light activity; + desaturation  F+   Visual/  Perceptual Glasses: reading                  Vitals:  3L via NC  Rest: O2 sat 95%, HR 72 bpm  EOB / ADL: O2 sat 92-93%, HR 75-81 bpm  transfer to BSC: O2 sat 88%, HR 80 bpm  Simulated toileting task / pivot to bed: O2 sat 88%, HR 87 bpm (2 minutes to recover)  Sit to supine: O2 sat 90%, HR 83 bpm  Rest in bed: O2 sat 93%, HR 80 bpm      Hand dominance: right     Strength ROM Additional Info:    RUE   3+/5 wfl fair  and wfl FMC/dexterity noted during ADL tasks     LUE 3+/5 wfl fair  and fair FMC/dexterity noted during ADL tasks   Impaired finger to nose coordination L UE; mild ataxia noted  Hearing: wfl  Sensation: denies numbness and tingling  Tone: wfl  Edema:none noted                             Comments: Upon arrival, patient supine in bed and agreeable to OT session. Pt demonstrating fair understanding of education/techniques, requiring additional training / education. At end of session, patient seated in bed with call light and phone within reach, all lines and tubes intact. Pt would benefit from continued skilled OT to increase safety,  independence and quality of life.     Treatment:  OT services provided: Facilitation of bed mobility, unsupported sitting balance (impacting ADLs; addressing posture, weight shifting, dynamic reaching), functional transfers (various surfaces: bed, BSC), standing tolerance tasks (addressing posture, balance and activity tolerance at w/w; impacting ADLs and functional activity) and functional ambulation tasks with w/w (to/from [facility] in room; + unsteadiness ; cuing on posture, w/w midline orientation, scapular stability/mobility, normalization of muscle tone, and facilitation of volitional active controled movement  Positioning to improve functional independence      Rehab Potential: Good for established goals     Patient / Family Goal: return home      Patient and/or family were instructed on functional diagnosis, prognosis/goals and OT plan of care. Demonstrated poor understanding. AM-PAC Daily Activity Inpatient   How much help for putting on and taking off regular lower body clothing?: A Lot  How much help for Bathing?: A Lot  How much help for Toileting?: A Lot  How much help for putting on and taking off regular upper body clothing?: A Little  How much help for taking care of personal grooming?: A Little  How much help for eating meals?: A Little  AM-PAC Inpatient Daily Activity Raw Score: 15  AM-PAC Inpatient ADL T-Scale Score : 34.69  ADL Inpatient CMS 0-100% Score: 56.46  ADL Inpatient CMS G-Code Modifier : CK         Eval Complexity: mod  Profile and History- med (extensive chart review)  Assessment of Occupational Performance and Identification of Deficits- med  Clinical Decision Making- med     Time In: 1356  Time Out: 1427  Total Treatment Time: 23 minutes    Min Units   OT Eval Low 10244       OT Eval Medium 97166  x 1   OT Eval High 86036       OT Re-Eval [id number]       Therapeutic Ex 08773       Therapeutic Activities 46874  63  1   ADL/Self Care 71584  11  1   Orthotic Management 04541       Neuro Re-Ed 34494       Non-Billable Time          Evaluation Time includes thorough review of current medical information, gathering information on past medical history/social history and prior level of function, completion of standardized testing/informal observation of tasks, assessment of data and education on plan of care and goals.            Faiza Cox OTR/L #[id number]

## 2020-12-08 NOTE — PROGRESS NOTES
Through out the evening pt has been trying to get out of bed and seems a little bit more confused. This nurse checked his temp orally at approx [id number] and he was found to have a fever of 102.8. Tylenol has been given and ice bags placed under the arms with a cold compress to the forehead. Will continue to monitor. Recheck at approx 1845 temp was 98.6.

## 2020-12-08 NOTE — PLAN OF CARE
Problem: Airway Clearance - Ineffective  Goal: Achieve or maintain patent airway  Outcome: Met This Shift     Problem: Gas Exchange - Impaired  Goal: Absence of hypoxia  Outcome: Met This Shift     Problem: Breathing Pattern - Ineffective  Goal: Ability to achieve and maintain a regular respiratory rate  Outcome: Met This Shift

## 2020-12-08 NOTE — PROGRESS NOTES
[facility]  Hematology/Oncology  Consult      Patient Name: Avi Rosa  YOB: 1940  PCP: [facility] MD KRYSTEN   Referring Provider:      Reason for Consultation:   Chief Complaint   Patient presents with    GI Bleeding     x 3 days, + COVID        Subjective:  Breathing slightly improved. Hgb improved s/p transfusion    History of Present Illness:  27-year-old man hospitalized with GI bleeding and he also tested positive for COVID-19. He has been complaining of progressively worsening cough along with shortness of breath and he has a 70-pack-year history of smoking. He also has history of stroke and dementia and is a very poor historian. CT scan of the chest showed interval development of progressive extensive mediastinal and hilar lymphadenopathy largest and subcarinal space measuring up to 5.7 cm with involvement of left hilum with lymphadenopathy extending to superior mediastinum with reticulonodular markings and interstitial fibrosis. There is an enlarging 3.2 x 2 cm left lower lobe mass abutting the pleura. In the upper abdomen there is a large 5.4 cm right adrenal mass suspicious for metastasis  Multiple retrocaval nodes also noted suspicious for metastatic disease.       Diagnostic Data:     Past Medical History:   Diagnosis Date    Chronic back pain     Hyperlipidemia     Hypertension     Osteoarthritis     Type II or unspecified type diabetes mellitus without mention of complication, not stated as uncontrolled        Patient Active Problem List    Diagnosis Date Noted    Metastatic malignant neoplasm ([address]     COVID-19 virus infection 12/05/2020    COPD (chronic obstructive pulmonary disease) ([address] 12/05/2020    GI bleed 12/05/2020    JOHAN (acute kidney injury) ([address] 12/02/2020    Epigastric pain 03/18/2020    Anemia     Iron deficiency anemia due to chronic blood loss     Chest pain     Occipital stroke ([address] 03/27/2014    HTN (hypertension) 03/27/2014  Type 2 diabetes mellitus ([facility] 03/27/2014    Lumbar spine instability 03/21/2014    Back pain 11/09/2012    Spinal stenosis, lumbar region, without neurogenic claudication 11/09/2012    Radiculopathy, lumbar region 11/09/2012        Past Surgical History:   Procedure Laterality Date    CAROTID ENDARTERECTOMY  2008    COLONOSCOPY      COLONOSCOPY N/A 4/16/2018    COLONOSCOPY WITH BIOPSY performed by Jazzmine Nelson MD at [address] COLONOSCOPY  4/16/2018    COLONOSCOPY CONTROL HEMORRHAGE performed by Jazzmine Nelson MD at [address] [place] [address], [facility]  02/05/2017    SPINE SURGERY  2002    by Dr Dawit Morgan N/A 4/14/2018    EGD BIOPSY performed by Fredis Quintanilla DO at [place] [address] History  History reviewed. No pertinent family history. Social History    TOBACCO:   reports that he has been smoking cigarettes. He has been smoking about 2.00 packs per day. He has never used smokeless tobacco.  ETOH:   reports no history of alcohol use. Home Medications  Prior to Admission medications    Medication Sig Start Date End Date Taking?  Authorizing Provider   gemfibrozil (LOPID) 600 MG tablet Take 600 mg by mouth 2 times daily   Yes Historical Provider, MD   metFORMIN (GLUCOPHAGE) 850 MG tablet Take 850 mg by mouth 2 times daily   Yes Historical Provider, MD   pantoprazole (PROTONIX) 40 MG tablet Take 40 mg by mouth daily   Yes Historical Provider, MD   Acetaminophen (APAP) 325 MG TABS Take 650 mg by mouth every 6 hours as needed for Pain or Fever   Yes Historical Provider, MD   camphor-menthol (SARNA) 0.5-0.5 % lotion Apply 1 Bottle topically every 6 hours as needed for Itching To the Chest   Yes Historical Provider, MD   vitamin D (ERGOCALCIFEROL) 1.25 MG (67752 UT) CAPS capsule Take 50,000 Units by mouth once a week Sundays   Yes Historical Provider, MD   ferrous sulfate (IRON 325) 325 (65 Fe) MG tablet Take 325 mg by mouth 2 times daily Yes Historical Provider, MD   aluminum & magnesium hydroxide-simethicone (MAALOX) 200-200-20 MG/5ML SUSP suspension Take 30 mLs by mouth as needed (Epigastric Pain)   Yes Historical Provider, MD   nitroGLYCERIN (NITROSTAT) 0.4 MG SL tablet Place 0.4 mg under the tongue every 5 minutes as needed for Chest pain up to max of 3 total doses. Yes Historical Provider, MD   vitamin C (ASCORBIC ACID) 500 MG tablet Take 500 mg by mouth 2 times daily   Yes Historical Provider, MD   glipiZIDE (GLUCOTROL) 5 MG tablet Take 1 tablet by mouth every morning (before breakfast) 3/20/20  Yes Mary Montano MD   atorvastatin (LIPITOR) 20 MG tablet Take 1 tablet by mouth daily 3/19/20  Yes Mary Montano MD   clopidogrel (PLAVIX) 75 MG tablet Take 75 mg by mouth daily   Yes Historical Provider, MD   atenolol (TENORMIN) 50 MG tablet Take 50 mg by mouth daily. Yes Historical Provider, MD   lisinopril (PRINIVIL;ZESTRIL) 40 MG tablet Take 40 mg by mouth daily. Yes Historical Provider, MD       Allergies  Allergies   Allergen Reactions    Sulfa Antibiotics        Review of Systems:    Relevant for cough progressively worsening shortness of breath and dark stools. Objective  BP (!) 188/81   Pulse 66   Temp 97.1 °F (36.2 °C) (Temporal)   Resp 20   SpO2 95%     Physical Exam:     General: Alert but confused in no acute distress,   Head and neck : PERRLA, EOMI . Sclera non icteric. Oropharynx : Clear  Neck: no JVD,  no adenopathy,  Heart: Regular rate and regular rhythm,   Lungs: Scattered rhonchi  Extremities: No edema,no cyanosis, no clubbing. Abdomen: Soft, non-tender;no masses, no organomegaly  Neurologic:Cranial nerves grossly intact. No focal motor or sensory deficits .     Recent Laboratory Data-   Lab Results   Component Value Date    WBC 5.6 12/08/2020    HGB 8.3 (L) 12/08/2020    HCT 24.9 (L) 12/08/2020    MCV 91.9 12/08/2020     12/08/2020    LYMPHOPCT 16.5 (L) 12/02/2020    RBC 2.71 (L) 12/08/2020 MCH 30.6 12/08/2020    MCHC 33.3 12/08/2020    RDW 15.4 (H) 12/08/2020    NEUTOPHILPCT 60.0 12/02/2020    MONOPCT 14.0 (H) 12/02/2020    BASOPCT 0.7 12/02/2020    NEUTROABS 4.57 12/02/2020    LYMPHSABS 1.26 (L) 12/02/2020    MONOSABS 1.07 (H) 12/02/2020    EOSABS 0.47 12/02/2020    BASOSABS 0.05 12/02/2020       Lab Results   Component Value Date     12/08/2020    K 4.2 12/08/2020     12/08/2020    CO2 27 12/08/2020    BUN 23 12/08/2020    CREATININE 1.6 (H) 12/08/2020    GLUCOSE 66 (L) 12/08/2020    CALCIUM 8.3 (L) 12/08/2020    PROT 7.7 12/02/2020    LABALBU 3.9 12/02/2020    BILITOT <0.2 12/02/2020    ALKPHOS 85 12/02/2020    AST 10 12/02/2020    ALT 7 12/02/2020    LABGLOM 42 12/08/2020    GFRAA 51 12/08/2020       Lab Results   Component Value Date    IRON 40 (L) 12/05/2020    TIBC 198 (L) 12/05/2020           Radiology-    Ct Abdomen Pelvis Wo Contrast    Result Date: 12/2/2020  EXAMINATION: CT OF THE ABDOMEN AND PELVIS WITHOUT CONTRAST 12/2/2020 3:00 pm TECHNIQUE: CT of the abdomen and pelvis was performed without the administration of intravenous contrast. Multiplanar reformatted images are provided for review. Dose modulation, iterative reconstruction, and/or weight based adjustment of the mA/kV was utilized to reduce the radiation dose to as low as reasonably achievable. COMPARISON: CT of the abdomen and pelvis, 03/18/2020 HISTORY: ORDERING SYSTEM PROVIDED HISTORY: pain TECHNOLOGIST PROVIDED HISTORY: Reason for exam:->pain What reading provider will be dictating this exam?->CRC FINDINGS: Organs: Liver: Unremarkable. Gallbladder: Cholelithiasis noted in the region of the gallbladder neck. No pericholecystic edema. Pancreas: Unremarkable. Spleen:  Unremarkable. Adrenals: Compared to the previous CT from 03/18/2020, there is interval development of a large mass in the right adrenal gland consistent with metastatic disease. It measures approximately 4.9 x 3.6 cm.   There is probable 1.7 cm metastatic lesion in the body of the left adrenal gland. Additional hypodense foci are stable and likely represent adenomas. Kidneys: Innumerable renal cysts are seen. No stone or hydronephrosis. GI/Bowel: Liquid stool in the colon indicates a diarrheal illness. No bowel wall thickening or obstruction noted. Prominent distal colonic diverticulosis without diverticulitis. Normal appendix. Pelvis: Urinary bladder is decompressed by a Adan catheter but is otherwise grossly unremarkable. The prostate gland is enlarged, approximately measuring 4.0 x 4.8 x 3.9 cm. Peritoneum/Retroperitoneum: Interval development of an enlarged, 2.5 x 2.1 cm retrocaval lymph node, likely metastatic (series 7, image 56). No free air or free fluid. Moderate calcified atherosclerosis is seen in the aorta. No aneurysm. Bones/Soft Tissues: No fracture is seen. No bony destructive lesion. Decompressive laminectomies with posterior body fusion hardware seen at L4-5. Severe disc degenerative changes seen in the lower thoracic and lumbar spine. 1. Evidence of metastatic disease in the abdomen and pelvis which has developed in the interim since 03/18/2020. 2. Large right adrenal mass measuring 4.9 x 3.6 cm, likely metastatic. 3. 1.7 cm left adrenal mass likely metastatic. Multiple left adrenal adenomas. 4. Solitary enlarged retrocaval lymph node, likely metastatic. 5.  Liquid stool in the colon indicates a diarrheal illness. No bowel wall thickening or obstruction noted. Ct Chest Wo Contrast    Result Date: 12/2/2020  EXAMINATION: CT OF THE CHEST WITHOUT CONTRAST 12/2/2020 3:00 pm TECHNIQUE: CT of the chest was performed without the administration of intravenous contrast. Multiplanar reformatted images are provided for review. Dose modulation, iterative reconstruction, and/or weight based adjustment of the mA/kV was utilized to reduce the radiation dose to as low as reasonably achievable.  COMPARISON: April 6, 2020 HISTORY: ORDERING SYSTEM PROVIDED HISTORY: trauma TECHNOLOGIST PROVIDED HISTORY: Reason for exam:->trauma What reading provider will be dictating this exam?->CRC FINDINGS: Mediastinum: Interval development/progression of extensive mediastinal and bilateral hilar adenopathy. These measure up to 4.8 x 3.1 cm in the subcarinal space, 5.7 x 4.4 cm in the left hilum, and 2.4 by 2.2 cm in the superior mediastinum. Lungs/pleura: Stable reticular markings and interstitial fibrosis predominantly involving the peripheral lung zones noted. No airspace consolidation or pneumothorax. There is an enlarging 3.2 x 2.0 cm left lower lobe mass abutting against the pleural a, previously measured 2.2 x 1.3 cm. Previously noted small left pleural effusion has resolved. Upper Abdomen: Interval development of 5.4 x 3.6 cm right suprarenal mass. The left adrenal gland remains enlarged. There are small calcified gallstones. Multiple cystic changes noted in the visualized portion of the kidneys. Soft Tissues/Bones: No lytic or blastic bony lesions. Degenerative changes in the spine. Enlarging pleural based left lower lobe mass and interval development of extensive mediastinal and bilateral hilar adenopathy, right suprarenal/adrenal mass. These are most compatible with metastatic process. Peripheral interstitial fibrosis of the lungs. The findings were sent to the Radiology Results [address] at 3:37 pm on 12/2/2020to be communicated to a licensed caregiver. ASSESSMENT/PLAN : [de-identified] man    Left lung mass with extensive mediastinal and hilar lymphadenopathy with evidence of intra-abdominal lymphadenopathy and bulky right adrenal metastasis. This is highly consistent with metastatic lung cancer in a heavy smoker. Seen by pulmonology Dr. Heather Zamarripa. COVID-19 pneumonia    Will eventually need tissue diagnosis which would include a CT-guided biopsy of right adrenal mass to be planned after recovery from Covid.   He is also followed by pulmonary with possible plans for EBUS. Hypoxia secondary to Covid pneumonia and COPD  Anemia multifactorial likely related to GI bleed and also contributed by CKD stage III  Continues on Protonix. Check iron studies reticulocyte and LDH and monitor hemoglobin    12/8/20  - Cont on dexamethasone for covid-19 PNA  - Agree, either adrenal mass or bronch w/ EBUS appropriate for tissue diagnosis. To be completed in ~2 weeks  - Once tissue diagnosis established, prognosis and systemic therapy options will be discussed  - MRI of the brain to complete staging. This should be ordered prior to DC.  Renal function improving, once OK with nephrology  - Hgb improved s/p transfusion      Anmol Hannah MD  Electronically signed 12/8/2020 at 3:29 PM

## 2020-12-08 NOTE — CARE COORDINATION
SW received call back from son/NOMI Gutierrez, discussed current plan, he is agreeable to plan for [facility] when medically ready. FRANCOIS/MERLE will continue to follow along.     Electronically signed by OSMAN Rashid on 12/8/2020 at 3:47 PM

## 2020-12-08 NOTE — PROGRESS NOTES
Department of Internal Medicine  Nephrology Attending Consult Note    Events reviewed. SUBJECTIVE: We are following Mr. Altagracia Fields for JOHAN. Reports feeling better, has no complaints.     PHYSICAL EXAM:      Vitals:    VITALS:  BP (!) 188/81   Pulse 66   Temp 97.1 °F (36.2 °C) (Temporal)   Resp 20   SpO2 95%   24HR INTAKE/OUTPUT:      Intake/Output Summary (Last 24 hours) at 12/8/2020 1437  Last data filed at 12/8/2020 1433  Gross per 24 hour   Intake 770 ml   Output 2200 ml   Net -1430 ml       Constitutional: Patient is awake alert in no distress  HEENT: Pupils are equal reactive  Respiratory: Lungs are clear  Cardiovascular/Edema: Heart sounds RRR  Gastrointestinal: Abdomen soft  Neurologic: Nonfocal  Skin: No lesions  Other: No edema    Scheduled Meds:   sodium chloride  20 mL Intravenous Once    sucralfate  1 g Oral 4 times per day    magnesium oxide  400 mg Oral BID    pantoprazole  40 mg Oral BID AC    sodium polystyrene  15 g Oral Once    influenza virus vaccine  0.5 mL Intramuscular Prior to discharge    atenolol  50 mg Oral Daily    atorvastatin  20 mg Oral Daily    [Held by provider] clopidogrel  75 mg Oral Daily    ferrous sulfate  325 mg Oral BID WC    gemfibrozil  600 mg Oral BID AC    glipiZIDE  5 mg Oral QAM AC    vitamin C  500 mg Oral BID    vitamin D  50,000 Units Oral Weekly     Continuous Infusions:   dextrose Stopped (12/05/20 0856)     PRN Meds:.glucose, dextrose, glucagon (rDNA), dextrose, acetaminophen, aluminum & magnesium hydroxide-simethicone, nitroGLYCERIN    DATA:    CBC:   Lab Results   Component Value Date    WBC 5.6 12/08/2020    RBC 2.71 12/08/2020    HGB 8.3 12/08/2020    HCT 24.9 12/08/2020    MCV 91.9 12/08/2020    MCH 30.6 12/08/2020    MCHC 33.3 12/08/2020    RDW 15.4 12/08/2020     12/08/2020    MPV 9.0 12/08/2020     CMP:    Lab Results   Component Value Date     12/08/2020    K 4.2 12/08/2020    K 6.4 12/02/2020     12/08/2020    CO2 27 12/08/2020    BUN 23 12/08/2020    CREATININE 1.6 12/08/2020    GFRAA 51 12/08/2020    LABGLOM 42 12/08/2020    GLUCOSE 66 12/08/2020    PROT 7.7 12/02/2020    LABALBU 3.9 12/02/2020    CALCIUM 8.3 12/08/2020    BILITOT <0.2 12/02/2020    ALKPHOS 85 12/02/2020    AST 10 12/02/2020    ALT 7 12/02/2020     Magnesium:    Lab Results   Component Value Date    MG 1.1 12/07/2020     Phosphorus:    Lab Results   Component Value Date    PHOS 2.4 12/07/2020          Radiology Review:      CT chest without IV contrast December 2, 2020   Enlarging pleural based left lower lobe mass and interval development of    extensive mediastinal and bilateral hilar adenopathy, right    suprarenal/adrenal mass. Margarita [place] are most compatible with metastatic process. Peripheral interstitial fibrosis of the lungs. The findings were sent to the Radiology Results [address] at 3:37    pm on 12/2/2020to be communicated to a licensed caregiver. CT abdomen and pelvis without IV contrast December 2, 2020   1. Evidence of metastatic disease in the abdomen and pelvis which has    developed in the interim since 03/18/2020.    2. Large right adrenal mass measuring 4.9 x 3.6 cm, likely metastatic.    3. 1.7 cm left adrenal mass likely metastatic.  Multiple left adrenal    adenomas. 4. Solitary enlarged retrocaval lymph node, likely metastatic. 5.  Liquid stool in the colon indicates a diarrheal illness.  No bowel wall    thickening or obstruction noted. BRIEF SUMMARY OF INITIAL CONSULT:    Briefly Mr. Lázaro Calzada is a 27-year-old man with history of CKD stage 3, baseline creatinine 1.8-2 mg/dl, HTN, type II DM, CVA, dementia, recently diagnosed with COVID-19, who was referred to the ER from [facility] for evaluation of GI bleeding.   In the ER she was found to have a CT scan of the chest which show extensive mediastinal and hilar lymphadenopathy as well as a mass in the left lower lobe lung and multiple lesions of the abdomen consistent with metastatic disease. On admission case creatinine level of 3.1 mg/dL, potassium level 6.4 mEq/L and bicarbonate level of 15 mEq/L; with IV fluid resuscitation his renal function has improved but he continues to have persistent hyperkalemia metabolic acidosis, resolved for this consultation. His medications prior to admission including Metformin, lisinopril. Problems resolved:    · JOHAN on CKD, volume responsive prerenal JOHAN, multifactorial including poor intake, GI bleed in setting of ACE inhibition. Resolved, renal function has improved with IV fluids. Creatinine level probably at baseline. · Hyperkalemia, multifactorial, secondary to #1, ACE inhibition and possibly adrenal insufficiency  · Low bicarbonate levels with hyperkalemia, likely non-anion gap metabolic acidosis, 2/2 CKD with decrease ammoniogenesis, ACE inhibition, diarrhea?,  adrenal insufficiency? IMPRESSION/RECOMMENDATIONS:        1. CKD stage III, probably due to underlying nephrosclerosis versus diabetic kidney disease, baseline creatinine 1.8-2 mg/dL    2. Metastatic cancer, probably primary lung CA  3. HTN, on atenolol 50 mg daily  4. Rule out adrenal insufficiency, 2/2  metastatic disease, presently on dexamethasone  ----------------------------------------------------------  5. Anemia, rule out GI bleeding, on pantoprazole and sucralfate  6. COVID-19 pneumonia, on dexamethasone  7. Type II DM, on glipizide   8. Hyperlipidemia, on atorvastatin, gemfibrozil    Plan:    · Continue to monitor renal function  · Continue to monitor H&H  · Obtain cortisol levels in a.m.   · Continue dexamethasone  · Continue to hold Metformin

## 2020-12-08 NOTE — PROGRESS NOTES
Admit Date: 12/2/2020    Subjective:     No new change ,surgical consult appreciated     Objective:     Patient Vitals for the past 8 hrs:   BP Temp Temp src Pulse Resp SpO2   12/08/20 0008 (!) 154/70 98.4 °F (36.9 °C) Oral 59 20 97 %     I/O last 3 completed shifts: In: 770 [P.O.:420; Blood:350]  Out: 1800 [Urine:1800]  No intake/output data recorded.       Scheduled Meds:   sodium chloride  20 mL Intravenous Once    sucralfate  1 g Oral 4 times per day    magnesium oxide  400 mg Oral BID    pantoprazole  40 mg Oral BID AC    sodium polystyrene  15 g Oral Once    influenza virus vaccine  0.5 mL Intramuscular Prior to discharge    atenolol  50 mg Oral Daily    atorvastatin  20 mg Oral Daily    [Held by provider] clopidogrel  75 mg Oral Daily    ferrous sulfate  325 mg Oral BID WC    gemfibrozil  600 mg Oral BID AC    glipiZIDE  5 mg Oral QAM AC    vitamin C  500 mg Oral BID    vitamin D  50,000 Units Oral Weekly     Continuous Infusions:   dextrose Stopped (12/05/20 0856)       CBC with Differential:    Lab Results   Component Value Date    WBC 5.6 12/08/2020    RBC 2.71 12/08/2020    HGB 8.3 12/08/2020    HCT 24.9 12/08/2020     12/08/2020    MCV 91.9 12/08/2020    MCH 30.6 12/08/2020    MCHC 33.3 12/08/2020    RDW 15.4 12/08/2020    LYMPHOPCT 16.5 12/02/2020    MONOPCT 14.0 12/02/2020    BASOPCT 0.7 12/02/2020    MONOSABS 1.07 12/02/2020    LYMPHSABS 1.26 12/02/2020    EOSABS 0.47 12/02/2020    BASOSABS 0.05 12/02/2020     CMP:    Lab Results   Component Value Date     12/08/2020    K 4.2 12/08/2020    K 6.4 12/02/2020     12/08/2020    CO2 27 12/08/2020    BUN 23 12/08/2020    CREATININE 1.6 12/08/2020    GFRAA 51 12/08/2020    LABGLOM 42 12/08/2020    PROT 7.7 12/02/2020    LABALBU 3.9 12/02/2020    CALCIUM 8.3 12/08/2020    BILITOT <0.2 12/02/2020    ALKPHOS 85 12/02/2020    AST 10 12/02/2020    ALT 7 12/02/2020     PT/INR:    Lab Results   Component Value Date    PROTIME 13.1

## 2020-12-08 NOTE — CARE COORDINATION
Pt currently 3.5 L O2 (COVID +). SW called pt in room, alert to self and place; confused to situation and time. No DPOA in electronic or soft chart. Noted on NF transfer form lynette Oliveira listed as primary. SW called lynette Oliveira, no ans and lvm. SW called sister Pratibha Drummond, she reported possibly having pt return with other sister at d/c. Will NEED PTOT, charge RN made aware and [facility] [place] with PTOT notified. SW made sister Pratibha Drummond aware, will need to confirm any plan with [facility] as decision maker. She reported will try to get in touch with family today.   FRANCOIS/MERLE will continue to follow and assist.    Electronically signed by OSMAN Helm on 12/8/2020 at 11:22 AM

## 2020-12-08 NOTE — CARE COORDINATION
Social work received call back from pts sister. She reported plan for pt to return to [facility] when medically ready. SW encouraged her to have son call, she reported he will likely not return call. Plan remains [facility] when ready, no precert needed.   Envelope previous completed by FRANCOIS.  FRANCOIS/MERLE will continue to follow and assist.    Electronically signed by OSMAN Jacob on 12/8/2020 at 1:54 PM

## 2020-12-09 LAB
ANION GAP SERPL CALCULATED.3IONS-SCNC: 7 MMOL/L (ref 7–16)
BUN BLDV-MCNC: 23 MG/DL (ref 8–23)
CALCIUM SERPL-MCNC: 8.6 MG/DL (ref 8.6–10.2)
CHLORIDE BLD-SCNC: 102 MMOL/L (ref 98–107)
CO2: 27 MMOL/L (ref 22–29)
CREAT SERPL-MCNC: 1.6 MG/DL (ref 0.7–1.2)
GFR AFRICAN AMERICAN: 51
GFR NON-AFRICAN AMERICAN: 42 ML/MIN/1.73
GLUCOSE BLD-MCNC: 32 MG/DL (ref 74–99)
HCT VFR BLD CALC: 29.6 % (ref 37–54)
HEMOGLOBIN: 9.8 G/DL (ref 12.5–16.5)
MCH RBC QN AUTO: 30.3 PG (ref 26–35)
MCHC RBC AUTO-ENTMCNC: 33.1 % (ref 32–34.5)
MCV RBC AUTO: 91.6 FL (ref 80–99.9)
METER GLUCOSE: 146 MG/DL (ref 74–99)
METER GLUCOSE: 50 MG/DL (ref 74–99)
METER GLUCOSE: 98 MG/DL (ref 74–99)
METER GLUCOSE: <40 MG/DL (ref 74–99)
PDW BLD-RTO: 15 FL (ref 11.5–15)
PLATELET # BLD: 266 E9/L (ref 130–450)
PMV BLD AUTO: 9.1 FL (ref 7–12)
POTASSIUM SERPL-SCNC: 4.3 MMOL/L (ref 3.5–5)
RBC # BLD: 3.23 E12/L (ref 3.8–5.8)
SODIUM BLD-SCNC: 136 MMOL/L (ref 132–146)
WBC # BLD: 6 E9/L (ref 4.5–11.5)

## 2020-12-09 PROCEDURE — 82533 TOTAL CORTISOL: CPT

## 2020-12-09 PROCEDURE — 6370000000 HC RX 637 (ALT 250 FOR IP): Performed by: INTERNAL MEDICINE

## 2020-12-09 PROCEDURE — 80048 BASIC METABOLIC PNL TOTAL CA: CPT

## 2020-12-09 PROCEDURE — 2700000000 HC OXYGEN THERAPY PER DAY

## 2020-12-09 PROCEDURE — 85027 COMPLETE CBC AUTOMATED: CPT

## 2020-12-09 PROCEDURE — 6370000000 HC RX 637 (ALT 250 FOR IP): Performed by: STUDENT IN AN ORGANIZED HEALTH CARE EDUCATION/TRAINING PROGRAM

## 2020-12-09 PROCEDURE — 2060000000 HC ICU INTERMEDIATE R&B

## 2020-12-09 PROCEDURE — 2580000003 HC RX 258: Performed by: STUDENT IN AN ORGANIZED HEALTH CARE EDUCATION/TRAINING PROGRAM

## 2020-12-09 PROCEDURE — 36415 COLL VENOUS BLD VENIPUNCTURE: CPT

## 2020-12-09 PROCEDURE — 82962 GLUCOSE BLOOD TEST: CPT

## 2020-12-09 RX ORDER — COSYNTROPIN 0.25 MG/ML
250 INJECTION, POWDER, FOR SOLUTION INTRAMUSCULAR; INTRAVENOUS ONCE
Status: COMPLETED | OUTPATIENT
Start: 2020-12-10 | End: 2020-12-10

## 2020-12-09 RX ADMIN — GEMFIBROZIL 600 MG: 600 TABLET ORAL at 16:37

## 2020-12-09 RX ADMIN — OXYCODONE HYDROCHLORIDE AND ACETAMINOPHEN 500 MG: 500 TABLET ORAL at 21:54

## 2020-12-09 RX ADMIN — ATORVASTATIN CALCIUM 20 MG: 20 TABLET, FILM COATED ORAL at 10:20

## 2020-12-09 RX ADMIN — MAGNESIUM GLUCONATE 500 MG ORAL TABLET 400 MG: 500 TABLET ORAL at 10:20

## 2020-12-09 RX ADMIN — ATENOLOL 50 MG: 50 TABLET ORAL at 10:20

## 2020-12-09 RX ADMIN — SUCRALFATE 1 G: 1 TABLET ORAL at 06:49

## 2020-12-09 RX ADMIN — PANTOPRAZOLE SODIUM 40 MG: 40 TABLET, DELAYED RELEASE ORAL at 06:49

## 2020-12-09 RX ADMIN — OXYCODONE HYDROCHLORIDE AND ACETAMINOPHEN 500 MG: 500 TABLET ORAL at 10:20

## 2020-12-09 RX ADMIN — ACETAMINOPHEN 650 MG: 325 TABLET ORAL at 21:54

## 2020-12-09 RX ADMIN — SUCRALFATE 1 G: 1 TABLET ORAL at 16:37

## 2020-12-09 RX ADMIN — DEXTROSE MONOHYDRATE 12.5 G: 25 INJECTION, SOLUTION INTRAVENOUS at 11:47

## 2020-12-09 RX ADMIN — GEMFIBROZIL 600 MG: 600 TABLET ORAL at 06:49

## 2020-12-09 RX ADMIN — FERROUS SULFATE TAB 325 MG (65 MG ELEMENTAL FE) 325 MG: 325 (65 FE) TAB at 16:38

## 2020-12-09 RX ADMIN — SUCRALFATE 1 G: 1 TABLET ORAL at 11:41

## 2020-12-09 RX ADMIN — FERROUS SULFATE TAB 325 MG (65 MG ELEMENTAL FE) 325 MG: 325 (65 FE) TAB at 10:20

## 2020-12-09 RX ADMIN — PANTOPRAZOLE SODIUM 40 MG: 40 TABLET, DELAYED RELEASE ORAL at 16:38

## 2020-12-09 RX ADMIN — GLIPIZIDE 5 MG: 5 TABLET ORAL at 06:49

## 2020-12-09 RX ADMIN — DEXTROSE MONOHYDRATE 12.5 G: 25 INJECTION, SOLUTION INTRAVENOUS at 16:38

## 2020-12-09 ASSESSMENT — PAIN SCALES - GENERAL
PAINLEVEL_OUTOF10: 0
PAINLEVEL_OUTOF10: 6

## 2020-12-09 NOTE — PLAN OF CARE
Problem: Airway Clearance - Ineffective  Goal: Achieve or maintain patent airway  Outcome: Met This Shift     Problem: Gas Exchange - Impaired  Goal: Absence of hypoxia  Outcome: Met This Shift     Problem: Gas Exchange - Impaired  Goal: Promote optimal lung function  Outcome: Met This Shift     Problem: Breathing Pattern - Ineffective  Goal: Ability to achieve and maintain a regular respiratory rate  Outcome: Met This Shift     Problem: Body Temperature -  Risk of, Imbalanced  Goal: Ability to maintain a body temperature within defined limits  Outcome: Met This Shift     Problem:  Body Temperature -  Risk of, Imbalanced  Goal: Will regain or maintain usual level of consciousness  Outcome: Met This Shift

## 2020-12-09 NOTE — PROGRESS NOTES
Admit Date: 12/2/2020    Subjective:     Lying in bed comfortable down to 2 L NC O2     Objective:     Patient Vitals for the past 8 hrs:   BP Temp Temp src Pulse Resp SpO2   12/08/20 2315 135/68 97.7 °F (36.5 °C) Temporal 73 20 92 %     I/O last 3 completed shifts: In: 80 [P.O.:780]  Out: 2175 [Urine:2175]  I/O this shift:  In: -   Out: 600 [Urine:600]    HEENT: Normal  NECK: Thyroid normal. No carotid bruit. No lymphphadenopathy. CVS: RRR  RS: Clear. No wheeze. No rhonchi. ABD: Soft. Non tender. No mass. Normal BS. EXT: No edema. Non tender. Pulses present.    NEURO: confusion at time       Scheduled Meds:   sodium chloride  20 mL Intravenous Once    sucralfate  1 g Oral 4 times per day    magnesium oxide  400 mg Oral BID    pantoprazole  40 mg Oral BID AC    sodium polystyrene  15 g Oral Once    influenza virus vaccine  0.5 mL Intramuscular Prior to discharge    atenolol  50 mg Oral Daily    atorvastatin  20 mg Oral Daily    [Held by provider] clopidogrel  75 mg Oral Daily    ferrous sulfate  325 mg Oral BID WC    gemfibrozil  600 mg Oral BID AC    glipiZIDE  5 mg Oral QAM AC    vitamin C  500 mg Oral BID    vitamin D  50,000 Units Oral Weekly     Continuous Infusions:   dextrose Stopped (12/05/20 0856)       CBC with Differential:    Lab Results   Component Value Date    WBC 5.6 12/08/2020    RBC 2.71 12/08/2020    HGB 8.3 12/08/2020    HCT 24.9 12/08/2020     12/08/2020    MCV 91.9 12/08/2020    MCH 30.6 12/08/2020    MCHC 33.3 12/08/2020    RDW 15.4 12/08/2020    LYMPHOPCT 16.5 12/02/2020    MONOPCT 14.0 12/02/2020    BASOPCT 0.7 12/02/2020    MONOSABS 1.07 12/02/2020    LYMPHSABS 1.26 12/02/2020    EOSABS 0.47 12/02/2020    BASOSABS 0.05 12/02/2020     CMP:    Lab Results   Component Value Date     12/08/2020    K 4.2 12/08/2020    K 6.4 12/02/2020     12/08/2020    CO2 27 12/08/2020    BUN 23 12/08/2020    CREATININE 1.6 12/08/2020    GFRAA 51 12/08/2020    LABGLOM 42

## 2020-12-09 NOTE — PROGRESS NOTES
Department of Internal Medicine  Nephrology Attending Consult Note    Events reviewed. SUBJECTIVE: We are following MrAlexa Rudd for JOHAN. Reports feeling better, has no complaints.     PHYSICAL EXAM:      Vitals:    VITALS:  /66   Pulse 65   Temp 97.9 °F (36.6 °C) (Temporal)   Resp 18   Ht 5' 7\" (1.702 m)   SpO2 95%   BMI 26.63 kg/m²   24HR INTAKE/OUTPUT:      Intake/Output Summary (Last 24 hours) at 12/9/2020 1237  Last data filed at 12/9/2020 2079  Gross per 24 hour   Intake 240 ml   Output 1925 ml   Net -1685 ml       Constitutional: Patient is awake alert in no distress  HEENT: Pupils are equal reactive  Respiratory: Lungs are clear  Cardiovascular/Edema: Heart sounds RRR  Gastrointestinal: Abdomen soft  Neurologic: Nonfocal  Skin: No lesions  Other: No edema    Scheduled Meds:   sodium chloride  20 mL Intravenous Once    sucralfate  1 g Oral 4 times per day    pantoprazole  40 mg Oral BID AC    sodium polystyrene  15 g Oral Once    influenza virus vaccine  0.5 mL Intramuscular Prior to discharge    atenolol  50 mg Oral Daily    atorvastatin  20 mg Oral Daily    [Held by provider] clopidogrel  75 mg Oral Daily    ferrous sulfate  325 mg Oral BID WC    gemfibrozil  600 mg Oral BID AC    glipiZIDE  5 mg Oral QAM AC    vitamin C  500 mg Oral BID    vitamin D  50,000 Units Oral Weekly     Continuous Infusions:   dextrose Stopped (12/05/20 0856)     PRN Meds:.glucose, dextrose, glucagon (rDNA), dextrose, acetaminophen, aluminum & magnesium hydroxide-simethicone, nitroGLYCERIN    DATA:    CBC:   Lab Results   Component Value Date    WBC 6.0 12/09/2020    RBC 3.23 12/09/2020    HGB 9.8 12/09/2020    HCT 29.6 12/09/2020    MCV 91.6 12/09/2020    MCH 30.3 12/09/2020    MCHC 33.1 12/09/2020    RDW 15.0 12/09/2020     12/09/2020    MPV 9.1 12/09/2020     CMP:    Lab Results   Component Value Date     12/09/2020    K 4.3 12/09/2020    K 6.4 12/02/2020     12/09/2020    CO2 27 12/09/2020    BUN 23 12/09/2020    CREATININE 1.6 12/09/2020    GFRAA 51 12/09/2020    LABGLOM 42 12/09/2020    GLUCOSE 32 12/09/2020    PROT 7.7 12/02/2020    LABALBU 3.9 12/02/2020    CALCIUM 8.6 12/09/2020    BILITOT <0.2 12/02/2020    ALKPHOS 85 12/02/2020    AST 10 12/02/2020    ALT 7 12/02/2020     Magnesium:    Lab Results   Component Value Date    MG 1.1 12/07/2020     Phosphorus:    Lab Results   Component Value Date    PHOS 2.4 12/07/2020          Radiology Review:      CT chest without IV contrast December 2, 2020   Enlarging pleural based left lower lobe mass and interval development of    extensive mediastinal and bilateral hilar adenopathy, right    suprarenal/adrenal mass. Thyra Render are most compatible with metastatic process. Peripheral interstitial fibrosis of the lungs. The findings were sent to the Radiology Results [address] at 3:37    pm on 12/2/2020to be communicated to a licensed caregiver. CT abdomen and pelvis without IV contrast December 2, 2020   1. Evidence of metastatic disease in the abdomen and pelvis which has    developed in the interim since 03/18/2020.    2. Large right adrenal mass measuring 4.9 x 3.6 cm, likely metastatic.    3. 1.7 cm left adrenal mass likely metastatic.  Multiple left adrenal    adenomas. 4. Solitary enlarged retrocaval lymph node, likely metastatic. 5.  Liquid stool in the colon indicates a diarrheal illness.  No bowel wall    thickening or obstruction noted. BRIEF SUMMARY OF INITIAL CONSULT:    Briefly Mr. Amanda Orozco is a 80-year-old man with history of CKD stage 3, baseline creatinine 1.8-2 mg/dl, HTN, type II DM, CVA, dementia, recently diagnosed with COVID-19, who was referred to the ER from [address] [facility] for evaluation of GI bleeding.   In the ER she was found to have a CT scan of the chest which show extensive mediastinal and hilar lymphadenopathy as well as a mass in the left lower lobe lung and multiple lesions of the abdomen consistent with metastatic disease. On admission case creatinine level of 3.1 mg/dL, potassium level 6.4 mEq/L and bicarbonate level of 15 mEq/L; with IV fluid resuscitation his renal function has improved but he continues to have persistent hyperkalemia metabolic acidosis, resolved for this consultation. His medications prior to admission including Metformin, lisinopril. Problems resolved:    · JOHAN on CKD, volume responsive prerenal JOHAN, multifactorial including poor intake, GI bleed in setting of ACE inhibition. Resolved, renal function has improved with IV fluids. Creatinine level probably at baseline. · Hyperkalemia, multifactorial, secondary to #1, ACE inhibition and possibly adrenal insufficiency  · Low bicarbonate levels with hyperkalemia, likely non-anion gap metabolic acidosis, 2/2 CKD with decrease ammoniogenesis, ACE inhibition, diarrhea?,  adrenal insufficiency? IMPRESSION/RECOMMENDATIONS:        1. CKD stage III, probably due to underlying nephrosclerosis versus diabetic kidney disease, baseline creatinine 1.8-2 mg/dL; renal function improved and stable, creatinine at 1.6 mg/dL. 2. Metastatic cancer, probably primary lung CA  3. HTN, on atenolol 50 mg daily  4. Rule out adrenal insufficiency, 2/2  metastatic disease, presently on dexamethasone. Cortsol level 6.87, awaiting cosyntropin test results. ----------------------------------------------------------  5. Anemia, GI bleeding?, on pantoprazole and sucralfate  6. COVID-19 pneumonia, on dexamethasone  7. Type II DM, on glipizide   8.  Hyperlipidemia, on atorvastatin, gemfibrozil    Plan:    · Await cosyntropin test results  · Continue to monitor renal function  · Discharge planning  · Follow-up with me in 4 to 6 weeks  · BMP in 2 weeks

## 2020-12-09 NOTE — DISCHARGE INSTR - COC
virus infection U07.1    COPD (chronic obstructive pulmonary disease) (HCC) J44.9    GI bleed K92.2    Metastatic malignant neoplasm (HCC) C79.9       Isolation/Infection:   Isolation            Droplet Plus          Patient Infection Status       Infection Onset Added Last Indicated Last Indicated By Review Planned Expiration Resolved Resolved By    COVID-19 12/02/20 12/02/20 12/02/20 Respiratory Panel, Molecular, with COVID-19 (Restricted: peds pts or suitable admitted adults) 12/09/20 12/16/20      Resolved    COVID-19 Rule Out 12/02/20 12/02/20 12/02/20 Respiratory Panel, Molecular, with COVID-19 (Restricted: peds pts or suitable admitted adults) (Ordered)   12/02/20 Rule-Out Test Resulted    COVID-19 Rule Out 04/06/20 04/06/20 04/06/20 Emergent Disease Panel (Ordered)   04/13/20 Rule-Out Test Resulted            Nurse Assessment:  Last Vital Signs: /68   Pulse 73   Temp 97.7 °F (36.5 °C) (Temporal)   Resp 20   SpO2 92%     Last documented pain score (0-10 scale): Pain Level: 0  Last Weight:   Wt Readings from Last 1 Encounters:   04/06/20 170 lb (77.1 kg)     Mental Status:  oriented, alert and intermitent confusion    IV Access:  - None    Nursing Mobility/ADLs:  Walking   Assisted  Transfer  Assisted  Bathing  Assisted  Dressing  Assisted  Toileting  Assisted  Feeding  Independent  Med Admin  Independent  Med Delivery   whole    Wound Care Documentation and Therapy:        Elimination:  Continence:   · Bowel: No  · Bladder: No  Urinary Catheter: Removal Date 12/10/2020   Colostomy/Ileostomy/Ileal Conduit: No        Date of Last BM: 12/10/2020    Intake/Output Summary (Last 24 hours) at 12/9/2020 0652  Last data filed at 12/9/2020 0452  Gross per 24 hour   Intake 360 ml   Output 1925 ml   Net -1565 ml     I/O last 3 completed shifts: In: 80 [P.O.:780]  Out: 2175 [Urine:2175]    Safety Concerns:      At Risk for Falls    Impairments/Disabilities:      None    Nutrition Therapy:  Current Nutrition Therapy:   - Oral Diet:  Peptic Ulcer    Routes of Feeding: Oral  Liquids: No Restrictions  Daily Fluid Restriction: no  Last Modified Barium Swallow with Video (Video Swallowing Test): not done    Treatments at the Time of Hospital Discharge:   Respiratory Treatments: N/A  Oxygen Therapy:  is on oxygen at 2 L/min per nasal cannula. Ventilator:    - No ventilator support    Rehab Therapies: Physical Therapy and Occupational Therapy  Weight Bearing Status/Restrictions: No weight bearing restirctions  Other Medical Equipment (for information only, NOT a DME order):  walker, bedside commode and hospital bed  Other Treatments: ***    Patient's personal belongings (please select all that are sent with patient):  None    RN SIGNATURE:  Electronically signed by Monda Koyanagi, RN on 12/10/20 at 5:49 PM EST    CASE MANAGEMENT/SOCIAL WORK SECTION    Inpatient Status Date: ***    Readmission Risk Assessment Score:  Readmission Risk              Risk of Unplanned Readmission:        23           Discharging to Facility/ Agency   · Name:   · Address:  · Phone:  · Fax:    Dialysis Facility (if applicable)   · Name:  · Address:  · Dialysis Schedule:  · Phone:  · Fax:    / signature: {Esignature:[id number]:::0}    PHYSICIAN SECTION    Prognosis: {Prognosis:2702870762:::0}    Condition at Discharge: Camelia Richardson Patient Condition:[id number]:::0}    Rehab Potential (if transferring to Rehab): {Prognosis:[id number]:::0}    Recommended Labs or Other Treatments After Discharge: ***    Physician Certification: I certify the above information and transfer of Scott Lechuga  is necessary for the continuing treatment of the diagnosis listed and that he requires {Admit to Appropriate Level of Care:06666:::0} for {GREATER/LESS:566674475} 30 days.      Update Admission H&P: {CHP DME Changes in HandP:601202474:::0}    PHYSICIAN SIGNATURE:  Sharon Philip MD.

## 2020-12-09 NOTE — PROGRESS NOTES
Comprehensive Nutrition Assessment    Type and Reason for Visit:  Initial(LOS)    Nutrition Recommendations/Plan: Recommend and start Ensure High Protein supplement BID (vanilla) and Boost Pudding supplement daily to help meet nutritional needs and d/t decreased po intake of meals. Monitor potassium levels as available and adjust diet as needed. WNL at this time. Nutrition Assessment:  Patient at nutritional risk d/t decreased po intake of meals since admission (25-50%) ; pt also COVID-19 positive ; lung and adrenal mass noted ; suspected metastatic lung CA ; noted GI bleed ; hx of COPD and CKD ; will start ONS    Malnutrition Assessment:  Malnutrition Status: At risk for malnutrition (Comment)    Context:  Acute Illness     Findings of the 6 clinical characteristics of malnutrition:  Energy Intake:  1 - 75% or less of estimated energy requirements for 7 or more days  Weight Loss:  Unable to assess(d/t lack of weight history)     Body Fat Loss:  Unable to assess(data not available to assess at this time d/t COVID isolation)     Muscle Mass Loss:  Unable to assess(data not available to assess at this time d/t COVID isolation)    Fluid Accumulation:  No significant fluid accumulation     Strength:  Not Performed    Estimated Daily Nutrient Needs:  Energy (kcal):  9195-4244 (REE 1441 x 1.2 SF); Weight Used for Energy Requirements:  Current     Protein (g):   (1.3-1.5g/kg IBW); Weight Used for Protein Requirements:  Ideal        Fluid (ml/day):  4360-8070; Method Used for Fluid Requirements:  1 ml/kcal      Nutrition Related Findings:  -I&Os (-6.3 L), no edema, active BS, loose stools, A&O x 4, missing teeth, redness to coccyx, SOB, decreased appetite      Wounds:  None       Current Nutrition Therapies:    DIET PEPTIC ULCER;     Anthropometric Measures:  · Height: 5' 7\" (170.2 cm)  · Current Body Weight: 170 lb (77.1 kg)(no weights upon admission)   · Admission Body Weight:      · Usual Body Weight: 170 lb (77.1 kg)(4/6/20 and 3/17/20)     · Ideal Body Weight: 148 lbs; % Ideal Body Weight 114.9 %   · BMI: 26.6  · BMI Categories: Overweight (BMI 25.0-29. 9)       Nutrition Diagnosis:   · Inadequate oral intake related to inadequate protein-energy intake(2/2 to acute viral illness) as evidenced by poor intake prior to admission, intake 26-50%, diarrhea      Nutrition Interventions:   Food and/or Nutrient Delivery:  Continue Current Diet, Start Oral Nutrition Supplement  Nutrition Education/Counseling:  Education not indicated   Coordination of Nutrition Care:  Continue to monitor while inpatient    Goals:  Patient will consume 50-75% of  meals served       Nutrition Monitoring and Evaluation:   Behavioral-Environmental Outcomes:  Beliefs and Attitutes   Food/Nutrient Intake Outcomes:  Food and Nutrient Intake, Supplement Intake  Physical Signs/Symptoms Outcomes:  Biochemical Data, Chewing or Swallowing, GI Status, Fluid Status or Edema, Diarrhea, Hemodynamic Status, Meal Time Behavior, Nutrition Focused Physical Findings, Skin, Weight     Discharge Planning:    Continue current diet     Electronically signed by Chencho Daily RD, LD on 12/9/20 at 9:41 AM EST    Contact: [phone]

## 2020-12-10 VITALS
DIASTOLIC BLOOD PRESSURE: 69 MMHG | OXYGEN SATURATION: 98 % | HEART RATE: 83 BPM | TEMPERATURE: 98.3 F | SYSTOLIC BLOOD PRESSURE: 139 MMHG | HEIGHT: 67 IN | RESPIRATION RATE: 16 BRPM | BODY MASS INDEX: 26.63 KG/M2

## 2020-12-10 LAB
ANION GAP SERPL CALCULATED.3IONS-SCNC: 9 MMOL/L (ref 7–16)
BUN BLDV-MCNC: 22 MG/DL (ref 8–23)
CALCIUM SERPL-MCNC: 8.6 MG/DL (ref 8.6–10.2)
CHLORIDE BLD-SCNC: 100 MMOL/L (ref 98–107)
CO2: 25 MMOL/L (ref 22–29)
CREAT SERPL-MCNC: 1.6 MG/DL (ref 0.7–1.2)
GFR AFRICAN AMERICAN: 51
GFR NON-AFRICAN AMERICAN: 42 ML/MIN/1.73
GLUCOSE BLD-MCNC: 65 MG/DL (ref 74–99)
HCT VFR BLD CALC: 29.5 % (ref 37–54)
HEMOGLOBIN: 9.6 G/DL (ref 12.5–16.5)
MCH RBC QN AUTO: 29.9 PG (ref 26–35)
MCHC RBC AUTO-ENTMCNC: 32.5 % (ref 32–34.5)
MCV RBC AUTO: 91.9 FL (ref 80–99.9)
METER GLUCOSE: 118 MG/DL (ref 74–99)
METER GLUCOSE: 122 MG/DL (ref 74–99)
METER GLUCOSE: 135 MG/DL (ref 74–99)
METER GLUCOSE: 63 MG/DL (ref 74–99)
METER GLUCOSE: 68 MG/DL (ref 74–99)
PDW BLD-RTO: 14.7 FL (ref 11.5–15)
PLATELET # BLD: 233 E9/L (ref 130–450)
PMV BLD AUTO: 9.1 FL (ref 7–12)
POTASSIUM SERPL-SCNC: 4.1 MMOL/L (ref 3.5–5)
RBC # BLD: 3.21 E12/L (ref 3.8–5.8)
SODIUM BLD-SCNC: 134 MMOL/L (ref 132–146)
WBC # BLD: 5.6 E9/L (ref 4.5–11.5)

## 2020-12-10 PROCEDURE — 82533 TOTAL CORTISOL: CPT

## 2020-12-10 PROCEDURE — 6370000000 HC RX 637 (ALT 250 FOR IP): Performed by: INTERNAL MEDICINE

## 2020-12-10 PROCEDURE — 6370000000 HC RX 637 (ALT 250 FOR IP): Performed by: STUDENT IN AN ORGANIZED HEALTH CARE EDUCATION/TRAINING PROGRAM

## 2020-12-10 PROCEDURE — 2700000000 HC OXYGEN THERAPY PER DAY

## 2020-12-10 PROCEDURE — 85027 COMPLETE CBC AUTOMATED: CPT

## 2020-12-10 PROCEDURE — 82962 GLUCOSE BLOOD TEST: CPT

## 2020-12-10 PROCEDURE — 36415 COLL VENOUS BLD VENIPUNCTURE: CPT

## 2020-12-10 PROCEDURE — 6360000002 HC RX W HCPCS: Performed by: INTERNAL MEDICINE

## 2020-12-10 PROCEDURE — 80048 BASIC METABOLIC PNL TOTAL CA: CPT

## 2020-12-10 RX ADMIN — PANTOPRAZOLE SODIUM 40 MG: 40 TABLET, DELAYED RELEASE ORAL at 17:09

## 2020-12-10 RX ADMIN — SUCRALFATE 1 G: 1 TABLET ORAL at 17:09

## 2020-12-10 RX ADMIN — OXYCODONE HYDROCHLORIDE AND ACETAMINOPHEN 500 MG: 500 TABLET ORAL at 09:08

## 2020-12-10 RX ADMIN — COSYNTROPIN 250 MCG: 0.25 INJECTION, POWDER, LYOPHILIZED, FOR SOLUTION INTRAMUSCULAR; INTRAVENOUS at 09:08

## 2020-12-10 RX ADMIN — GLIPIZIDE 5 MG: 5 TABLET ORAL at 06:44

## 2020-12-10 RX ADMIN — PANTOPRAZOLE SODIUM 40 MG: 40 TABLET, DELAYED RELEASE ORAL at 06:44

## 2020-12-10 RX ADMIN — SUCRALFATE 1 G: 1 TABLET ORAL at 06:44

## 2020-12-10 RX ADMIN — GEMFIBROZIL 600 MG: 600 TABLET ORAL at 06:44

## 2020-12-10 RX ADMIN — SUCRALFATE 1 G: 1 TABLET ORAL at 11:43

## 2020-12-10 RX ADMIN — FERROUS SULFATE TAB 325 MG (65 MG ELEMENTAL FE) 325 MG: 325 (65 FE) TAB at 17:09

## 2020-12-10 RX ADMIN — ATENOLOL 50 MG: 50 TABLET ORAL at 09:08

## 2020-12-10 RX ADMIN — ATORVASTATIN CALCIUM 20 MG: 20 TABLET, FILM COATED ORAL at 09:08

## 2020-12-10 RX ADMIN — FERROUS SULFATE TAB 325 MG (65 MG ELEMENTAL FE) 325 MG: 325 (65 FE) TAB at 09:08

## 2020-12-10 RX ADMIN — GEMFIBROZIL 600 MG: 600 TABLET ORAL at 17:09

## 2020-12-10 ASSESSMENT — PAIN SCALES - GENERAL
PAINLEVEL_OUTOF10: 0
PAINLEVEL_OUTOF10: 0

## 2020-12-10 NOTE — CARE COORDINATION
Anticipate discharge today, awaiting Renal. Arranged transport with [facility] ambulance for 6:30pm. Notified Sylvia(communicare) of possible discharge/time. 9:44am received call from sister-Chemo, she would like [facility], discussed that pt has no criteria for LTAC after much discussion sister seemed to understand. Chemo wanted pt's cancer worked up, informed her that most tests have to wait until [place] free. Chemo then wanted this LSW to call Dr. Tay Karimi to inform him of this information, reminded her that Dr. Tay Karimi is attending here and is pt's pcp he is aware of pt's circumstance, [facility]. Eliecer [facility]

## 2020-12-10 NOTE — PROGRESS NOTES
[facility] [id number]   Department of Pharmacy   Pharmacist Transition of Care Services         Patient Demographics  Name:  Susie Foley   Medical Record Number:  [id number]  Gender:  male   Age:  [de-identified] y.o. YOB: 1940    Primary Care Physician: Melania Frederick MD  Primary Care Physician phone number:  [phone]  Readmission Risk (% from [facility] Patient List): 18%       Pharmacist Review and Summary of Medications     Date of last reviewed/update: 12/10/20     Category Comments   New Medication Started          Change in Outpatient Medication         Discontinued/On hold Outpatient Medication  1. Glipizide  2. Lisinopril  3. Plavix       Other          Documentation of Pharmacist Interventions and Follow-up Plan:     The following Pharmacist Transition of Care Services were completed:   [x]  Reviewed and summarized medication changes  []  Entire home medication list was reviewed for accuracy (sources: **)  []  Home medication list was updated or corrected     [x]  Reviewed discharge medication reconciliation  []  Discharge medication list was updated or corrected    [x]  Added information to AVS   []  Patient education was provided on new medications  []  Patient education was provided on medication changes  []  Reviewed the After Visit Summary (AVS) with patient    Additional Interventions:  []  Inpatient prescriber was contacted and the following pharmacy recommendations        were accepted: **     [] Other interventions: **        Pharmacist: Juan JohnsD, [facility]  Date:  12/10/2020 10:44 AM

## 2020-12-11 NOTE — DISCHARGE SUMMARY
Discharge Summary    Date: 12/11/2020  Patient Name: Jumana Davis YOB: 1940 Age: [de-identified] y.o. Admit Date: 12/2/2020  Discharge Date: 12/10/2020  Discharge Condition: [address]    Admission Diagnosis  JOHAN (acute kidney injury) ([facility] [address] (N17.9);JOHAN (acute kidney injury) ([facility] [address] (N17.9)     Discharge Diagnosis  Principal Problem: JOHAN (acute kidney injury) (HCC)Active Problems: Spinal stenosis, lumbar region, without neurogenic claudication HTN (hypertension) Type 2 diabetes mellitus (HCC) Anemia COVID-19 virus infection  COPD (chronic obstructive pulmonary disease) (HCC)  GI bleed  Metastatic malignant neoplasm (HCC)Resolved Problems:  * No resolved hospital problems. [facility] HOSPITAL Stay  Narrative of Hospital Course:  [facility] patient admitted from ER because of GI bleed known covid pos. Found with JOHAN dehydration and lung mass with metastasis seen by renal ,pulmonary ,oncology as well as surgery treated conservatively with IV fluid transfusion stabilized transferred back to NH to have bronchoscopy in couple weeks after cleared from Covid and or biopsy mass     Consultants:  Israel Whittaker TO ONCOLOGYIP CONSULT TO NEPHROLOGYIP CONSULT TO GENERAL SURGERY    Surgeries/procedures Performed:       Treatments:            Discharge Plan/Disposition:  To Non-[facility]    Hospital/Incidental Findings Requiring Follow Up:    Patient Instructions:    Diet: Regular Diet    Activity:Activity as Tolerated  For number of days (if applicable): Other Instructions:    Provider Follow-Up:   No follow-ups on file. Significant Diagnostic Studies:    Recent Labs:  Admission on 12/02/2020, Discharged on 12/10/2020No results displayed because visit has over 200 results. ------------    Radiology last 7 days:  No results found.      Pending Labs   Order Current Status  Basic metabolic panel Collected (12/04/20 5666)  Cortisol Total In process  Cortisol Total In process  Cortisol, 30 minutes In process  Cortisol, 60 minutes In process      Discharge Medications    Discharge Medication List as of 12/10/2020  5:53 PM    Discharge Medication List as of 12/10/2020  5:53 PM    Discharge Medication List as of 12/10/2020  5:53 PMCONTINUE these medications which have NOT CHANGEDgemfibrozil (LOPID) 600 MG tabletTake 600 mg by mouth 2 times dailyHistorical MedmetFORMIN (GLUCOPHAGE) 850 MG tabletTake 850 mg by mouth 2 times dailyHistorical Medpantoprazole (PROTONIX) 40 MG tabletTake 40 mg by mouth dailyHistorical MedAcetaminophen (APAP) 325 MG TABSTake 650 mg by mouth every 6 hours as needed for Pain or FeverHistorical Medcamphor-menthol (SARNA) 0.5-0.5 % lotionApply 1 Bottle topically every 6 hours as needed for Itching To the Chest, Topical, EVERY 6 HOURS PRN, Historical Medvitamin D (ERGOCALCIFEROL) 1.25 MG (93916 UT) CAPS capsuleTake 50,000 Units by mouth once a week SundaysHistorical Medferrous sulfate (IRON 325) 325 (65 Fe) MG tabletTake 325 mg by mouth 2 times dailyHistorical Medaluminum & magnesium hydroxide-simethicone (MAALOX) 200-200-20 MG/5ML SUSP suspensionTake 30 mLs by mouth as needed (Epigastric Pain)Historical MednitroGLYCERIN (NITROSTAT) 0.4 MG SL tabletPlace 0.4 mg under the tongue every 5 minutes as needed for Chest pain up to max of 3 total doses. Historical Medvitamin C (ASCORBIC ACID) 500 MG tabletTake 500 mg by mouth 2 times dailyHistorical Medatorvastatin (LIPITOR) 20 MG tabletTake 1 tablet by mouth daily, Disp-30 tablet,R-3Normalatenolol (TENORMIN) 50 MG tabletTake 50 mg by mouth daily. Historical Med    Discharge Medication List as of 12/10/2020  5:53 PMSTOP taking these medicationsglipiZIDE (GLUCOTROL) 5 MG tabletComments:Reason for Stopping:clopidogrel (PLAVIX) 75 MG tabletComments:Reason for Stopping:nicotine (Lira Grammes) 21 MG/24HRComments:Reason for Stopping:lisinopril (PRINIVIL;ZESTRIL) 40 MG tabletComments:Reason for Stopping:    Time Spent on Discharge:3E] minutes were

## 2020-12-12 LAB
CORTISOL TOTAL: 27.47 MCG/DL (ref 2.68–18.4)
CORTISOL TOTAL: 34.44 MCG/DL (ref 2.68–18.4)
CORTISOL TOTAL: 4.73 MCG/DL (ref 2.68–18.4)
CORTISOL TOTAL: 41.69 MCG/DL (ref 2.68–18.4)

## (undated) DEVICE — CANNULA NSL ORAL AD FOR CAPNOFLEX CO2 O2 AIRLFE

## (undated) DEVICE — CAESAR GRASPING FORCEPS: Brand: CAESAR

## (undated) DEVICE — FIAPC® PROBE W/ FILTER 2200 A OD 2.3MM/6.9FR; L 2.2M/7.2FT: Brand: ERBE

## (undated) DEVICE — FORCEPS BX L160CM JAW DIA2.4MM YEL L CAP W/ NDL DISP RAD